# Patient Record
Sex: FEMALE | Race: WHITE | NOT HISPANIC OR LATINO | Employment: FULL TIME | URBAN - METROPOLITAN AREA
[De-identification: names, ages, dates, MRNs, and addresses within clinical notes are randomized per-mention and may not be internally consistent; named-entity substitution may affect disease eponyms.]

---

## 2017-05-12 NOTE — PROGRESS NOTES
SUBJECTIVE:                                                    Sandy Floyd is a 41 year old female who presents to clinic today for the following health issues:      Would like to discuss starting low dose birth control      The patient reports she wants to begin Accutane and would like a prescription for birth control. She notes she experiences acne on her face, but not on her shoulders or back. She notes the acne is exacerbated with exercise. The patient states her last menstrual cycle was mid March. She notes she has abstained from intercourse for two weeks and she had a negative pregnancy test. She denies concerns with her mood.     Problem list and histories reviewed & adjusted, as indicated.  Additional history: as documented    Patient Active Problem List   Diagnosis     Back pain     Obesity     Pancreatitis, acute     Cholelithiasis     Dyspareunia     Pain in joint, pelvic region and thigh     Muscle weakness (generalized)     Moderate recurrent major depression (H)     CARDIOVASCULAR SCREENING; LDL GOAL LESS THAN 160     Anxiety state     Plantar fasciitis     Acne, unspecified acne type     Past Surgical History:   Procedure Laterality Date     CHOLECYSTECTOMY, LAPOROSCOPIC  3/15/11    Cholecystectomy, Laparoscopic     COSMETIC SURGERY       HC REDUCTION OF LARGE BREAST  3/11/09     SURGICAL HISTORY OF -   2003    laser tx on condyloma       Social History   Substance Use Topics     Smoking status: Never Smoker     Smokeless tobacco: Never Used      Comment: I don't smoke.     Alcohol use No     Family History   Problem Relation Age of Onset     CANCER Paternal Grandfather      unknown     Arthritis Maternal Grandmother      CANCER Father      prostate cancer     Prostate Cancer Father      Circulatory Maternal Grandfather      blood clots     Arthritis Paternal Grandmother      CANCER Brother      Testicular cancer     DIABETES Other      Hypertension Other      Asthma No family hx of       C.A.D. No family hx of      CEREBROVASCULAR DISEASE No family hx of      Breast Cancer No family hx of      Cancer - colorectal No family hx of      Alzheimer Disease No family hx of      Blood Disease No family hx of      Cardiovascular No family hx of      HEART DISEASE No family hx of      Eye Disorder No family hx of      GASTROINTESTINAL DISEASE No family hx of      Genitourinary Problems No family hx of      Lipids No family hx of      Musculoskeletal Disorder No family hx of      Neurologic Disorder No family hx of      Respiratory No family hx of      Thyroid Disease No family hx of          Current Outpatient Prescriptions   Medication Sig Dispense Refill     Cholecalciferol (VITAMIN D3 PO) Take 1 tablet by mouth daily       Omega-3 Fatty Acids (FISH OIL OMEGA-3 PO) Take 1 tablet by mouth 2 times daily       norgestimate-ethinyl estradiol (ORTHO-CYCLEN, SPRINTEC) 0.25-35 MG-MCG per tablet Take 1 tablet by mouth daily 84 tablet 0     FLUoxetine (PROZAC) 40 MG capsule Take 1 capsule (40 mg) by mouth daily 90 capsule 3     spironolactone (ALDACTONE) 50 MG tablet        DIFFERIN 0.1 % gel        Multiple Vitamin (DAILY VITAMINS PO)          Reviewed and updated as needed this visit by clinical staff  Tobacco  Allergies  Med Hx  Surg Hx  Fam Hx  Soc Hx      Reviewed and updated as needed this visit by Provider         ROS:  Constitutional, neuro, ENT, endocrine, pulmonary, cardiac, gastrointestinal, genitourinary, musculoskeletal, integument and psychiatric systems are negative, except as otherwise noted.    This document serves as a record of the services and decisions personally performed and made by Lauren Mathew DNP. It was created on her behalf by Marcelina Serna, a trained medical scribe. The creation of this document is based the provider's statements to the medical scribe.  Marcelina Serna 8:42 AM May 19, 2017      OBJECTIVE:                                                    /64  Pulse 68  Temp  "98  F (36.7  C) (Temporal)  Ht 5' 2.5\" (1.588 m)  Wt 210 lb (95.3 kg)  LMP 03/12/2017 (Approximate)  BMI 37.8 kg/m2  Body mass index is 37.8 kg/(m^2).  GENERAL APPEARANCE: healthy, alert and no distress  RESP: lungs clear to auscultation - no rales, rhonchi or wheezes  CV: regular rates and rhythm, normal S1 S2, no S3 or S4 and no murmur, click or rub  SKIN: no suspicious lesions or rashes, skin clear today  NEURO: Normal strength and tone, mentation intact and speech normal  PSYCH: mentation appears normal and affect normal/bright    Diagnostic test results:  No results found for this or any previous visit (from the past 24 hour(s)).     ASSESSMENT/PLAN:                                                        ICD-10-CM    1. Encounter for initial prescription of contraceptive pills Z30.011 norgestimate-ethinyl estradiol (ORTHO-CYCLEN, SPRINTEC) 0.25-35 MG-MCG per tablet   2. Acne, unspecified acne type L70.9               Discussed acne, Accutane, and birth control. Order placed for sprintec. Medication direction, dosage, and side effects discussed with patient. Patient to have blood pressure checked in three months. Advised patient to use a backup method of birth control for one week.     Follow up with Provider - 3 months   Follow up with: Dermatology for Accutane     JAVID Ordonez CNP  [facility]    The information in this document, created by a scribe for me, accurately reflects the services I personally performed and the decisions made by me. I have reviewed and approved this document for accuracy. JAVID Romero, CNP, DNP.   "

## 2017-05-19 ENCOUNTER — OFFICE VISIT (OUTPATIENT)
Dept: FAMILY MEDICINE | Facility: CLINIC | Age: 42
End: 2017-05-19
Payer: COMMERCIAL

## 2017-05-19 VITALS
SYSTOLIC BLOOD PRESSURE: 106 MMHG | HEIGHT: 63 IN | DIASTOLIC BLOOD PRESSURE: 64 MMHG | HEART RATE: 68 BPM | WEIGHT: 210 LBS | BODY MASS INDEX: 37.21 KG/M2 | TEMPERATURE: 98 F

## 2017-05-19 DIAGNOSIS — L70.9 ACNE, UNSPECIFIED ACNE TYPE: ICD-10-CM

## 2017-05-19 DIAGNOSIS — Z30.011 ENCOUNTER FOR INITIAL PRESCRIPTION OF CONTRACEPTIVE PILLS: Primary | ICD-10-CM

## 2017-05-19 PROCEDURE — 99213 OFFICE O/P EST LOW 20 MIN: CPT | Performed by: NURSE PRACTITIONER

## 2017-05-19 RX ORDER — NORGESTIMATE AND ETHINYL ESTRADIOL 0.25-0.035
1 KIT ORAL DAILY
Qty: 84 TABLET | Refills: 0 | Status: SHIPPED | OUTPATIENT
Start: 2017-05-19 | End: 2017-08-13

## 2017-05-19 ASSESSMENT — ANXIETY QUESTIONNAIRES
7. FEELING AFRAID AS IF SOMETHING AWFUL MIGHT HAPPEN: NOT AT ALL
3. WORRYING TOO MUCH ABOUT DIFFERENT THINGS: SEVERAL DAYS
GAD7 TOTAL SCORE: 4
2. NOT BEING ABLE TO STOP OR CONTROL WORRYING: SEVERAL DAYS
IF YOU CHECKED OFF ANY PROBLEMS ON THIS QUESTIONNAIRE, HOW DIFFICULT HAVE THESE PROBLEMS MADE IT FOR YOU TO DO YOUR WORK, TAKE CARE OF THINGS AT HOME, OR GET ALONG WITH OTHER PEOPLE: SOMEWHAT DIFFICULT
5. BEING SO RESTLESS THAT IT IS HARD TO SIT STILL: NOT AT ALL
6. BECOMING EASILY ANNOYED OR IRRITABLE: NOT AT ALL
1. FEELING NERVOUS, ANXIOUS, OR ON EDGE: SEVERAL DAYS

## 2017-05-19 ASSESSMENT — PATIENT HEALTH QUESTIONNAIRE - PHQ9: 5. POOR APPETITE OR OVEREATING: SEVERAL DAYS

## 2017-05-19 ASSESSMENT — PAIN SCALES - GENERAL: PAINLEVEL: NO PAIN (0)

## 2017-05-19 NOTE — NURSING NOTE
"Chief Complaint   Patient presents with     Contraception     Panel Management     PHQ-9/ESTRELLITA       Initial /64  Pulse 68  Temp 98  F (36.7  C) (Temporal)  Ht 5' 2.5\" (1.588 m)  Wt 210 lb (95.3 kg)  LMP 03/12/2017 (Approximate)  BMI 37.8 kg/m2 Estimated body mass index is 37.8 kg/(m^2) as calculated from the following:    Height as of this encounter: 5' 2.5\" (1.588 m).    Weight as of this encounter: 210 lb (95.3 kg).  Medication Reconciliation: complete     Kristan Ulloa CMA (AAMA)      "

## 2017-05-19 NOTE — MR AVS SNAPSHOT
After Visit Summary   5/19/2017    Sandy Floyd    MRN: [id number]           Patient Information     Date Of Birth          1975        Visit Information        Provider Department      5/19/2017 8:20 AM Lauren Mathew APRN CNP [facility]        Today's Diagnoses     Encounter for initial prescription of contraceptive pills    -  1    Acne, unspecified acne type           Follow-ups after your visit        Your next 10 appointments already scheduled     Aug 18, 2017  8:30 AM CDT   Nurse Only with RG FLOAT 1   [facility] ([facility])    [address], [address]  [place] [address] [phone]              Who to contact     If you have questions or need follow up information about today's clinic visit or your schedule please contact [facility] directly at [phone].  Normal or non-critical lab and imaging results will be communicated to you by MyChart, letter or phone within 4 business days after the clinic has received the results. If you do not hear from us within 7 days, please contact the clinic through EyeIChart or phone. If you have a critical or abnormal lab result, we will notify you by phone as soon as possible.  Submit refill requests through Callision or call your pharmacy and they will forward the refill request to us. Please allow 3 business days for your refill to be completed.          Additional Information About Your Visit        MyChart Information     Callision gives you secure access to your electronic health record. If you see a primary care provider, you can also send messages to your care team and make appointments. If you have questions, please call your primary care clinic.  If you do not have a primary care provider, please call [phone] and they will assist you.        Care EveryWhere ID     This is your Care EveryWhere ID. This could be used by other organizations to access your [facility]  "records  [id number]        Your Vitals Were     Pulse Temperature Height Last Period BMI (Body Mass Index)       68 98  F (36.7  C) (Temporal) 5' 2.5\" (1.588 m) 03/12/2017 (Approximate) 37.8 kg/m2        Blood Pressure from Last 3 Encounters:   05/19/17 106/64   08/24/16 106/72   05/11/15 110/80    Weight from Last 3 Encounters:   05/19/17 210 lb (95.3 kg)   08/24/16 203 lb 14.4 oz (92.5 kg)   05/11/15 186 lb 14.4 oz (84.8 kg)              Today, you had the following     No orders found for display         Today's Medication Changes          These changes are accurate as of: 5/19/17 11:59 PM.  If you have any questions, ask your nurse or doctor.               Start taking these medicines.        Dose/Directions    norgestimate-ethinyl estradiol 0.25-35 MG-MCG per tablet   Commonly known as:  ORTHO-CYCLEN, SPRINTEC   Used for:  Encounter for initial prescription of contraceptive pills   Started by:  Lauren Mathew APRN CNP        Dose:  1 tablet   Take 1 tablet by mouth daily   Quantity:  84 tablet   Refills:  0            Where to get your medicines      These medications were sent to [facility] - [place], [place] - [address]  [address] [address]     Phone:  [phone]     norgestimate-ethinyl estradiol 0.25-35 MG-MCG per tablet                Primary Care Provider Office Phone # Fax #    JAVID Mei [facility] [phone]       [facility] [address] [address] [address]        Thank you!     Thank you for choosing [facility]  for your care. Our goal is always to provide you with excellent care. Hearing back from our patients is one way we can continue to improve our services. Please take a few minutes to complete the written survey that you may receive in the mail after your visit with us. Thank you!             Your Updated Medication List - Protect others around you: Learn how to safely use, store and throw away your medicines at " www.disposemymeds.org.          This list is accurate as of: 5/19/17 11:59 PM.  Always use your most recent med list.                   Brand Name Dispense Instructions for use    DAILY VITAMINS PO          DIFFERIN 0.1 % gel   Generic drug:  adapalene          FISH OIL OMEGA-3 PO      Take 1 tablet by mouth 2 times daily       FLUoxetine 40 MG capsule    PROzac    90 capsule    Take 1 capsule (40 mg) by mouth daily       norgestimate-ethinyl estradiol 0.25-35 MG-MCG per tablet    ORTHO-CYCLEN, SPRINTEC    84 tablet    Take 1 tablet by mouth daily       spironolactone 50 MG tablet    ALDACTONE         VITAMIN D3 PO      Take 1 tablet by mouth daily

## 2017-05-20 ASSESSMENT — ANXIETY QUESTIONNAIRES: GAD7 TOTAL SCORE: 4

## 2017-05-20 ASSESSMENT — PATIENT HEALTH QUESTIONNAIRE - PHQ9: SUM OF ALL RESPONSES TO PHQ QUESTIONS 1-9: 2

## 2017-08-13 ENCOUNTER — TELEPHONE (OUTPATIENT)
Dept: FAMILY MEDICINE | Facility: CLINIC | Age: 42
End: 2017-08-13

## 2017-08-13 DIAGNOSIS — Z30.011 ENCOUNTER FOR INITIAL PRESCRIPTION OF CONTRACEPTIVE PILLS: ICD-10-CM

## 2017-08-15 RX ORDER — NORGESTIMATE AND ETHINYL ESTRADIOL 0.25-0.035
KIT ORAL
Qty: 28 TABLET | Refills: 0 | Status: SHIPPED | OUTPATIENT
Start: 2017-08-15 | End: 2017-08-28

## 2017-08-15 NOTE — TELEPHONE ENCOUNTER
norgestimate-ethinyl estradiol (ORTHO-CYCLEN, SPRINTEC) 0.25-35 MG-MCG per tablet      Last Written Prescription Date: 05/19/17  Last Fill Quantity: 84,  # refills: 0   Last Office Visit with G, P or [facility] prescribing provider: 05/19/17                                         Next 5 appointments (look out 90 days)     Aug 18, 2017  8:30 AM CDT   Nurse Only with RG FLOAT 1   [facility]    [address]  [place] [address] [phone]

## 2017-08-15 NOTE — TELEPHONE ENCOUNTER
norgestimate-ethinyl estradiol (ORTHO-CYCLEN, SPRINTEC) 0.25-35 MG-MCG per tablet  Medication is being filled for 1 time refill only due to:  Patient needs to be seen because due for 3 month follow up from starting this medication.     LM informing the patient she is due for a medication follow up prior to additional refills.     Zoila Hewitt, RN, BSN

## 2017-08-16 NOTE — TELEPHONE ENCOUNTER
Reason for call:  Pt is wondering if she can have a telephone visit for medication refill. Please advise, pt is coming in on Friday for a BP check with the nurse and she was seen back in may for this. Please let pt know if she can do a telephone visit or e-visit according to the process only MA or RN can schedule this. Please advise.

## 2017-08-18 ENCOUNTER — ALLIED HEALTH/NURSE VISIT (OUTPATIENT)
Dept: FAMILY MEDICINE | Facility: CLINIC | Age: 42
End: 2017-08-18
Payer: COMMERCIAL

## 2017-08-18 VITALS — HEART RATE: 66 BPM | DIASTOLIC BLOOD PRESSURE: 74 MMHG | SYSTOLIC BLOOD PRESSURE: 104 MMHG

## 2017-08-18 PROCEDURE — 99207 ZZC NO CHARGE NURSE ONLY: CPT

## 2017-08-18 NOTE — NURSING NOTE
Sandy Floyd is a 41 year old female who comes in today for a Blood Pressure check because of ongoing blood pressure monitoring.    *Document pulse and BP  *Use new set of vitals button for multiple readings.  *Use extended vitals for orthostatic    Vitals as recorded, a large cuff was used.    Patient is taking medication as prescribed  Patient is tolerating medications well.  Patient is not monitoring Blood Pressure at home.      Current complaints: none    Disposition: results routed to MD/AP

## 2017-08-18 NOTE — MR AVS SNAPSHOT
After Visit Summary   8/18/2017    Sandy Floyd    MRN: [id number]           Patient Information     Date Of Birth          1975        Visit Information        Provider Department      8/18/2017 8:30 AM RG FLOAT 1 [facility]        Today's Diagnoses     Contraception    -  1       Follow-ups after your visit        Your next 10 appointments already scheduled     Aug 28, 2017 12:20 PM CDT   Telephone Visit with JAVID Mei CNP   [facility] ([facility]    [address], [address]  [place] [address] [phone]           Note: this is not an onsite visit; there is no need to come to the facility.              Who to contact     If you have questions or need follow up information about today's clinic visit or your schedule please contact [facility] directly at [phone].  Normal or non-critical lab and imaging results will be communicated to you by MyChart, letter or phone within 4 business days after the clinic has received the results. If you do not hear from us within 7 days, please contact the clinic through The Luxury Club[employer] or phone. If you have a critical or abnormal lab result, we will notify you by phone as soon as possible.  Submit refill requests through [employer] or call your pharmacy and they will forward the refill request to us. Please allow 3 business days for your refill to be completed.          Additional Information About Your Visit        MyChart Information     [employer] gives you secure access to your electronic health record. If you see a primary care provider, you can also send messages to your care team and make appointments. If you have questions, please call your primary care clinic.  If you do not have a primary care provider, please call [phone] and they will assist you.        Care EveryWhere ID     This is your Care EveryWhere ID. This could be used by other organizations to access your [facility]  medical records  [id number]        Your Vitals Were     Pulse                   66            Blood Pressure from Last 3 Encounters:   08/18/17 104/74   05/19/17 106/64   08/24/16 106/72    Weight from Last 3 Encounters:   05/19/17 210 lb (95.3 kg)   08/24/16 203 lb 14.4 oz (92.5 kg)   05/11/15 186 lb 14.4 oz (84.8 kg)              Today, you had the following     No orders found for display       Primary Care Provider Office Phone # Fax #    Lauren PERES [place], APRN [facility] [phone] [address] [address]        Equal Access to Services     RODRÍGUEZ ROSENBERG : Hadii jerry scott hadasho Soomaali, waaxda luqadaha, qaybta kaalmada adeegyada, irish thompson . So [facility] [phone].    ATENCIÓN: Si habla español, tiene a phillips disposición servicios gratuitos de asistencia lingüística. Llame al [phone].    We comply with applicable federal civil rights laws and [place] laws. We do not discriminate on the basis of race, color, national origin, age, disability sex, sexual orientation or gender identity.            Thank you!     Thank you for choosing [facility]  for your care. Our goal is always to provide you with excellent care. Hearing back from our patients is one way we can continue to improve our services. Please take a few minutes to complete the written survey that you may receive in the mail after your visit with us. Thank you!             Your Updated Medication List - Protect others around you: Learn how to safely use, store and throw away your medicines at www.disposemymeds.org.          This list is accurate as of: 8/18/17 11:59 PM.  Always use your most recent med list.                   Brand Name Dispense Instructions for use Diagnosis    DAILY VITAMINS PO           DIFFERIN 0.1 % gel   Generic drug:  adapalene           FISH OIL OMEGA-3 PO      Take 1 tablet by mouth 2 times daily        FLUoxetine 40 MG capsule    PROzac    90 capsule    Take  1 capsule (40 mg) by mouth daily    Major depressive disorder, recurrent episode, moderate (H)       spironolactone 50 MG tablet    ALDACTONE          SPRINTEC 28 0.25-35 MG-MCG per tablet   Generic drug:  norgestimate-ethinyl estradiol     28 tablet    TAKE ONE TABLET BY MOUTH ONE TIME DAILY    Encounter for initial prescription of contraceptive pills       VITAMIN D3 PO      Take 1 tablet by mouth daily

## 2017-08-28 ENCOUNTER — TELEPHONE (OUTPATIENT)
Dept: FAMILY MEDICINE | Facility: CLINIC | Age: 42
End: 2017-08-28

## 2017-08-28 ENCOUNTER — VIRTUAL VISIT (OUTPATIENT)
Dept: FAMILY MEDICINE | Facility: CLINIC | Age: 42
End: 2017-08-28

## 2017-08-28 DIAGNOSIS — Z30.011 ENCOUNTER FOR INITIAL PRESCRIPTION OF CONTRACEPTIVE PILLS: ICD-10-CM

## 2017-08-28 DIAGNOSIS — Z53.9 ERRONEOUS ENCOUNTER--DISREGARD: Primary | ICD-10-CM

## 2017-08-28 RX ORDER — NORGESTIMATE AND ETHINYL ESTRADIOL 0.25-0.035
1 KIT ORAL DAILY
Qty: 84 TABLET | Refills: 3 | Status: SHIPPED | OUTPATIENT
Start: 2017-08-28 | End: 2017-08-31 | Stop reason: SINTOL

## 2017-08-28 RX ORDER — ISOTRETINOIN 40 MG/1
CAPSULE, LIQUID FILLED ORAL
Refills: 0 | COMMUNITY
Start: 2017-08-14 | End: 2019-05-14

## 2017-08-28 NOTE — PROGRESS NOTES
"Sandy Floyd is a 41 year old female who is being evaluated via a telephone visit.      The patient has been notified of following:     \"This telephone visit will be conducted via a call between you and your physician/provider. We have found that certain health care needs can be provided without the need for a physical exam.  This service lets us provide the care you need with a short phone conversation.  If a prescription is necessary we can send it directly to your pharmacy.  If lab work is needed we can place an order for that and you can then stop by our lab to have the test done at a later time.    We will bill your insurance company for this service.  Please check with your medical insurance if this type of visit is covered. You may be responsible for the cost of this type of visit if insurance coverage is denied.  The typical cost is $30 (10min), $59 (11-20min) and $85 (21-30min).  Most often these visits are shorter than 10 minutes.    If during the course of the call the physician/provider feels a telephone visit is not appropriate, you will not be charged for this service.\"       Consent has been obtained for this service by 2 care team members: yes. See the scanned image in the medical record.    Sandy Floyd complains of  Contraception      I have reviewed and updated the patient's Past Medical History, Social History, Family History and Medication List.    ALLERGIES  No known drug allergies    Vilma Monahan CMA   (MA signature)    Additional provider notes:     Assessment/Plan:  No diagnosis found.    I have reviewed the note as documented above.  This accurately captures the substance of my conversation with the patient,      Total time of call between patient and provider was  minutes     "

## 2017-08-28 NOTE — MR AVS SNAPSHOT
After Visit Summary   8/28/2017    Sandy Floyd    MRN: [id number]           Patient Information     Date Of Birth          1975        Visit Information        Provider Department      8/28/2017 12:20 PM Lauren Mathew APRN CNP [facility] [place]        Today's Diagnoses     ERRONEOUS ENCOUNTER--DISREGARD    -  1       Follow-ups after your visit        Who to contact     If you have questions or need follow up information about today's clinic visit or your schedule please contact [facility] directly at [phone].  Normal or non-critical lab and imaging results will be communicated to you by TweetPhotohart, letter or phone within 4 business days after the clinic has received the results. If you do not hear from us within 7 days, please contact the clinic through TweetPhotohart or phone. If you have a critical or abnormal lab result, we will notify you by phone as soon as possible.  Submit refill requests through Intellitactics or call your pharmacy and they will forward the refill request to us. Please allow 3 business days for your refill to be completed.          Additional Information About Your Visit        MyChart Information     Intellitactics gives you secure access to your electronic health record. If you see a primary care provider, you can also send messages to your care team and make appointments. If you have questions, please call your primary care clinic.  If you do not have a primary care provider, please call [phone] and they will assist you.        Care EveryWhere ID     This is your Care EveryWhere ID. This could be used by other organizations to access your [facility] medical records  [id number]         Blood Pressure from Last 3 Encounters:   08/18/17 104/74   05/19/17 106/64   08/24/16 106/72    Weight from Last 3 Encounters:   05/19/17 210 lb (95.3 kg)   08/24/16 203 lb 14.4 oz (92.5 kg)   05/11/15 186 lb 14.4 oz (84.8 kg)              Today, you had the following      No orders found for display       Primary Care Provider Office Phone # Fax #    JAVID Mei [facility] [phone] [address] [address]        Equal Access to Services     ADILIA ROSENBERG : Hadii jerry ku haderickao Soomaali, waaxda luqadaha, qaybta kaalmada adeegyada, waxmelani idiin sanazn kathi thomas lamarivel woods. So [facility] [phone].    ATENCIÓN: Si habla español, tiene a phillips disposición servicios gratuitos de asistencia lingüística. Llame al [phone].    We comply with applicable federal civil rights laws and [place] laws. We do not discriminate on the basis of race, color, national origin, age, disability sex, sexual orientation or gender identity.            Thank you!     Thank you for choosing [facility]  for your care. Our goal is always to provide you with excellent care. Hearing back from our patients is one way we can continue to improve our services. Please take a few minutes to complete the written survey that you may receive in the mail after your visit with us. Thank you!             Your Updated Medication List - Protect others around you: Learn how to safely use, store and throw away your medicines at www.disposemymeds.org.          This list is accurate as of: 8/28/17  8:54 PM.  Always use your most recent med list.                   Brand Name Dispense Instructions for use Diagnosis    CLARAVIS 40 MG capsule   Generic drug:  ISOtretinoin           DAILY VITAMINS PO           DIFFERIN 0.1 % gel   Generic drug:  adapalene           FISH OIL OMEGA-3 PO      Take 1 tablet by mouth 2 times daily        FLUoxetine 40 MG capsule    PROzac    90 capsule    Take 1 capsule (40 mg) by mouth daily    Major depressive disorder, recurrent episode, moderate (H)       spironolactone 50 MG tablet    ALDACTONE          SPRINTEC 28 0.25-35 MG-MCG per tablet   Generic drug:  norgestimate-ethinyl estradiol     28 tablet    TAKE ONE TABLET BY MOUTH ONE TIME DAILY    Encounter  for initial prescription of contraceptive pills       VITAMIN D3 PO      Take 1 tablet by mouth daily

## 2017-08-30 ENCOUNTER — MYC MEDICAL ADVICE (OUTPATIENT)
Dept: FAMILY MEDICINE | Facility: CLINIC | Age: 42
End: 2017-08-30

## 2017-08-31 ENCOUNTER — OFFICE VISIT (OUTPATIENT)
Dept: FAMILY MEDICINE | Facility: CLINIC | Age: 42
End: 2017-08-31
Payer: COMMERCIAL

## 2017-08-31 VITALS
HEART RATE: 76 BPM | RESPIRATION RATE: 20 BRPM | WEIGHT: 209 LBS | SYSTOLIC BLOOD PRESSURE: 106 MMHG | HEIGHT: 62 IN | DIASTOLIC BLOOD PRESSURE: 80 MMHG | BODY MASS INDEX: 38.46 KG/M2 | TEMPERATURE: 98.5 F

## 2017-08-31 DIAGNOSIS — I82.492 ACUTE DEEP VEIN THROMBOSIS (DVT) OF OTHER SPECIFIED VEIN OF LEFT LOWER EXTREMITY (H): Primary | ICD-10-CM

## 2017-08-31 PROCEDURE — 99214 OFFICE O/P EST MOD 30 MIN: CPT | Performed by: NURSE PRACTITIONER

## 2017-08-31 RX ORDER — RIVAROXABAN 15 MG/1
15 TABLET, FILM COATED ORAL
Refills: 0 | COMMUNITY
Start: 2017-08-29 | End: 2017-08-31 | Stop reason: ALTCHOICE

## 2017-08-31 RX ORDER — DABIGATRAN ETEXILATE 150 MG/1
CAPSULE ORAL
Qty: 180 CAPSULE | Refills: 0 | Status: SHIPPED | OUTPATIENT
Start: 2017-08-31 | End: 2019-03-14

## 2017-08-31 ASSESSMENT — ANXIETY QUESTIONNAIRES
1. FEELING NERVOUS, ANXIOUS, OR ON EDGE: MORE THAN HALF THE DAYS
2. NOT BEING ABLE TO STOP OR CONTROL WORRYING: MORE THAN HALF THE DAYS
7. FEELING AFRAID AS IF SOMETHING AWFUL MIGHT HAPPEN: SEVERAL DAYS
3. WORRYING TOO MUCH ABOUT DIFFERENT THINGS: MORE THAN HALF THE DAYS
GAD7 TOTAL SCORE: 10
5. BEING SO RESTLESS THAT IT IS HARD TO SIT STILL: SEVERAL DAYS
4. TROUBLE RELAXING: MORE THAN HALF THE DAYS
7. FEELING AFRAID AS IF SOMETHING AWFUL MIGHT HAPPEN: SEVERAL DAYS
6. BECOMING EASILY ANNOYED OR IRRITABLE: NOT AT ALL
GAD7 TOTAL SCORE: 10
GAD7 TOTAL SCORE: 10

## 2017-08-31 ASSESSMENT — PATIENT HEALTH QUESTIONNAIRE - PHQ9
SUM OF ALL RESPONSES TO PHQ QUESTIONS 1-9: 7
10. IF YOU CHECKED OFF ANY PROBLEMS, HOW DIFFICULT HAVE THESE PROBLEMS MADE IT FOR YOU TO DO YOUR WORK, TAKE CARE OF THINGS AT HOME, OR GET ALONG WITH OTHER PEOPLE: SOMEWHAT DIFFICULT
SUM OF ALL RESPONSES TO PHQ QUESTIONS 1-9: 7

## 2017-08-31 ASSESSMENT — PAIN SCALES - GENERAL: PAINLEVEL: MILD PAIN (2)

## 2017-08-31 NOTE — LETTER
August 31, 2017      Sandy Floyd  [address] [address]        To Whom It May Concern:    Sandy Floyd was seen in our clinic today and treated for a medical condition, DVT. She may return to work with the following: no restrictions on or about 9/5/17.       Sincerely,        JAVID Ordonez CNP

## 2017-08-31 NOTE — MR AVS SNAPSHOT
After Visit Summary   8/31/2017    Sandy Floyd    MRN: [id number]           Patient Information     Date Of Birth          1975        Visit Information        Provider Department      8/31/2017 3:20 PM Lauren Mathew APRN CNP [place] [facility]        Today's Diagnoses     Acute deep vein thrombosis (DVT) of other specified vein of left lower extremity (H)    -  1       Follow-ups after your visit        Additional Services     ONC/HEME ADULT REFERRAL       Your provider has referred you to: [facility]: [facility] [place] [facility] [phone]   [email or web address]    Please be aware that coverage of these services is subject to the terms and limitations of your health insurance plan.  Call member services at your health plan with any benefit or coverage questions.      Please bring the following with you to your appointment:    (1) Any X-Rays, CTs or MRIs which have been performed.  Contact the facility where they were done to arrange for  prior to your scheduled appointment.   (2) List of current medications  (3) This referral request   (4) Any documents/labs given to you for this referral                  Future tests that were ordered for you today     Open Future Orders        Priority Expected Expires Ordered    US Lower Extremity Venous Duplex Left Routine  8/31/2018 8/31/2017            Who to contact     If you have questions or need follow up information about today's clinic visit or your schedule please contact [facility] directly at [phone].  Normal or non-critical lab and imaging results will be communicated to you by MyChart, letter or phone within 4 business days after the clinic has received the results. If you do not hear from us within 7 days, please contact the clinic through MyChart or phone. If you have a critical or abnormal lab result, we will notify you by  "phone as soon as possible.  Submit refill requests through [employer] or call your pharmacy and they will forward the refill request to us. Please allow 3 business days for your refill to be completed.          Additional Information About Your Visit        [employer] Information     [employer] gives you secure access to your electronic health record. If you see a primary care provider, you can also send messages to your care team and make appointments. If you have questions, please call your primary care clinic.  If you do not have a primary care provider, please call [phone] and they will assist you.        Care EveryWhere ID     This is your Care EveryWhere ID. This could be used by other organizations to access your [place] medical records  [id number]        Your Vitals Were     Pulse Temperature Respirations Height Last Period BMI (Body Mass Index)    76 98.5  F (36.9  C) (Temporal) 20 5' 2.21\" (1.58 m) 07/15/2017 (Approximate) 37.98 kg/m2       Blood Pressure from Last 3 Encounters:   08/31/17 106/80   08/18/17 104/74   05/19/17 106/64    Weight from Last 3 Encounters:   08/31/17 209 lb (94.8 kg)   05/19/17 210 lb (95.3 kg)   08/24/16 203 lb 14.4 oz (92.5 kg)              We Performed the Following     DEPRESSION ACTION PLAN (DAP)     ONC/HEME ADULT REFERRAL          Today's Medication Changes          These changes are accurate as of: 8/31/17  4:06 PM.  If you have any questions, ask your nurse or doctor.               Start taking these medicines.        Dose/Directions    dabigatran ANTICOAGULANT 150 MG capsule   Commonly known as:  PRADAXA   Used for:  Acute deep vein thrombosis (DVT) of other specified vein of left lower extremity (H)   Started by:  Lauren Mathew APRN CNP        Take 1 capsule (150 mg) by mouth twice daily. Store in original 's bottle or blister pack; use within 120 days of opening.   Quantity:  180 capsule   Refills:  0         Stop taking these medicines if you haven't " already. Please contact your care team if you have questions.     norgestimate-ethinyl estradiol 0.25-35 MG-MCG per tablet   Commonly known as:  SPRINTEC 28   Stopped by:  Lauren Mathew APRN CNP           XARELTO 15 MG Tabs tablet   Generic drug:  rivaroxaban ANTICOAGULANT   Stopped by:  Lauren Mathew APRN CNP                Where to get your medicines      These medications were sent to [facility] - [place], [place] - [address]  [address], [facility] [address]     Phone:  [phone]     dabigatran ANTICOAGULANT 150 MG capsule                Primary Care Provider Office Phone # Fax #    JAVID Mei [phone] [address] [facility] [address]        Equal Access to Services     [facility]: Hadii jerry ku hadasho Soomaali, waaxda luqadaha, qaybta kaalmada adeegyada, irish idiin haypietro thompson . So [facility] [place] [phone].    ATENCIÓN: Si habla español, tiene a phillips disposición servicios gratuitos de asistencia lingüística. [facility] [phone].    We comply with applicable federal civil rights laws and [place] laws. We do not discriminate on the basis of race, color, national origin, age, disability sex, sexual orientation or gender identity.            Thank you!     Thank you for choosing [facility]  for your care. Our goal is always to provide you with excellent care. Hearing back from our patients is one way we can continue to improve our services. Please take a few minutes to complete the written survey that you may receive in the mail after your visit with us. Thank you!             Your Updated Medication List - Protect others around you: Learn how to safely use, store and throw away your medicines at www.disposemymeds.org.          This list is accurate as of: 8/31/17  4:06 PM.  Always use your most recent med list.                   Brand Name Dispense Instructions for use Diagnosis    CLARAVIS 40 MG capsule   Generic drug:   ISOtretinoin           dabigatran ANTICOAGULANT 150 MG capsule    PRADAXA    180 capsule    Take 1 capsule (150 mg) by mouth twice daily. Store in original 's bottle or blister pack; use within 120 days of opening.    Acute deep vein thrombosis (DVT) of other specified vein of left lower extremity (H)       DAILY VITAMINS PO           DIFFERIN 0.1 % gel   Generic drug:  adapalene           FISH OIL OMEGA-3 PO      Take 1 tablet by mouth daily        FLUoxetine 40 MG capsule    PROzac    90 capsule    Take 1 capsule (40 mg) by mouth daily    Major depressive disorder, recurrent episode, moderate (H)       spironolactone 50 MG tablet    ALDACTONE          VITAMIN D3 PO      Take 1 tablet by mouth daily

## 2017-08-31 NOTE — PROGRESS NOTES
"  SUBJECTIVE:                                                    Sandy Floyd is a 41 year old female who presents to clinic today for the following health issues:      Concern - Blood clot,   Onset: two days     Description:   The pain was very sensitive and ache pain of the left leg. Patient states that she saw clinical at her work place and had US done and found out that there are small blood clot on the left leg. No pain today     Intensity: 0/10    Progression of Symptoms:  \"it hard to tell, it feels better because off work two days now \"     Accompanying Signs & Symptoms:  Redness    Previous history of similar problem:   None     Precipitating factors:   Worsened by: Standing, walking     Alleviating factors:  Improved by: none    Therapies Tried and outcome: on blood thinner, resting at home     Patient reports the pain with sitting and walking has improved. She discontinued Accutane and oral contraceptives two days ago. She is taking Xarelto daily. She would like to finish Accutane treatment, and will abstain from for those months instead of using oral contraceptives. She denies known history of bleeding disorders or DVT. She denies shortness of breath.     Problem list and histories reviewed & adjusted, as indicated.  Additional history: as documented    Patient Active Problem List   Diagnosis     Back pain     Obesity     Pancreatitis, acute     Cholelithiasis     Dyspareunia     Pain in joint, pelvic region and thigh     Muscle weakness (generalized)     Moderate recurrent major depression (H)     CARDIOVASCULAR SCREENING; LDL GOAL LESS THAN 160     Anxiety state     Plantar fasciitis     Acne, unspecified acne type     Past Surgical History:   Procedure Laterality Date     CHOLECYSTECTOMY, LAPOROSCOPIC  3/15/11    Cholecystectomy, Laparoscopic     COSMETIC SURGERY       HC REDUCTION OF LARGE BREAST  3/11/09     SURGICAL HISTORY OF -   2003    laser tx on condyloma       Social History "   Substance Use Topics     Smoking status: Never Smoker     Smokeless tobacco: Never Used      Comment: I don't smoke.     Alcohol use No     Family History   Problem Relation Age of Onset     CANCER Paternal Grandfather      unknown     Arthritis Maternal Grandmother      CANCER Father      prostate cancer     Prostate Cancer Father      Circulatory Maternal Grandfather      blood clots     Arthritis Paternal Grandmother      CANCER Brother      Testicular cancer     DIABETES Other      Hypertension Other      Asthma No family hx of      C.A.D. No family hx of      CEREBROVASCULAR DISEASE No family hx of      Breast Cancer No family hx of      Cancer - colorectal No family hx of      Alzheimer Disease No family hx of      Blood Disease No family hx of      Cardiovascular No family hx of      HEART DISEASE No family hx of      Eye Disorder No family hx of      GASTROINTESTINAL DISEASE No family hx of      Genitourinary Problems No family hx of      Lipids No family hx of      Musculoskeletal Disorder No family hx of      Neurologic Disorder No family hx of      Respiratory No family hx of      Thyroid Disease No family hx of          Current Outpatient Prescriptions   Medication Sig Dispense Refill     dabigatran ANTICOAGULANT (PRADAXA) 150 MG capsule Take 1 capsule (150 mg) by mouth twice daily. Store in original 's bottle or blister pack; use within 120 days of opening. 180 capsule 0     CLARAVIS 40 MG capsule   0     Cholecalciferol (VITAMIN D3 PO) Take 1 tablet by mouth daily       Omega-3 Fatty Acids (FISH OIL OMEGA-3 PO) Take 1 tablet by mouth daily        FLUoxetine (PROZAC) 40 MG capsule Take 1 capsule (40 mg) by mouth daily 90 capsule 3     Multiple Vitamin (DAILY VITAMINS PO)        spironolactone (ALDACTONE) 50 MG tablet        DIFFERIN 0.1 % gel            ROS:  Constitutional, neuro, ENT, endocrine, pulmonary, cardiac, gastrointestinal, genitourinary, musculoskeletal, integument and  "psychiatric systems are negative, except as otherwise noted.    This document serves as a record of the services and decisions personally performed and made by Lauren Mathew DNP. It was created on her behalf by Patti Celis, a trained medical scribe. The creation of this document is based on the provider's statements to the medical scribe.  Patti Celis 3:34 PM August 31, 2017    OBJECTIVE:                                                    /80  Pulse 76  Temp 98.5  F (36.9  C) (Temporal)  Resp 20  Ht 5' 2.21\" (1.58 m)  Wt 209 lb (94.8 kg)  LMP 07/15/2017 (Approximate)  BMI 37.98 kg/m2  Body mass index is 37.98 kg/(m^2).  GENERAL APPEARANCE: healthy, alert and no distress  HENT: ear canals and TM's normal and nose and mouth without ulcers or lesions  NECK: no adenopathy, no asymmetry, masses, or scars and thyroid normal to palpation  RESP: lungs clear to auscultation - no rales, rhonchi or wheezes  CV: regular rates and rhythm, normal S1 S2, no S3 or S4 and no murmur, click or rub  MS: tender region about 4 cm on upper mid left calf, pink, warm but not hot to touch, no obvious leg swelling, negative homans   NEURO: Normal strength and tone, mentation intact and speech normal  PSYCH: mentation appears normal and affect normal/bright       ASSESSMENT/PLAN:                                                        ICD-10-CM    1. Acute deep vein thrombosis (DVT) of other specified vein of left lower extremity (H) I82.492 dabigatran ANTICOAGULANT (PRADAXA) 150 MG capsule     US Lower Extremity Venous Duplex Left     ONC/HEME ADULT REFERRAL       Suspect blood clot was provoked by Accutane and oral contraceptives. Advised patient to take blood thinners for three months, discontinue birth control and continue to take Accutane per discretion of dermatology- on thinners for three months, and could consider continuing acne medication. Discouraged use of estrogen containing oral contraceptives going " forward, regardless. Order placed for Pradaxa due to formulary. Medication direction, dosage, and side effects discussed with patient. Referral placed to hematology. Repeat US in 2-3 months.     Follow up with Provider - 3-6 months     The information in this document, created by the medical scribe for me, accurately reflects the services I personally performed and the decisions made by me. I have reviewed and approved this document for accuracy prior to leaving the patient care area.  August 31, 2017 3:34 PM    JAVID Ordonez [facility]

## 2017-08-31 NOTE — LETTER
My Depression Action Plan  Name: Sandy Floyd   Date of Birth 1975  Date: 8/31/2017    My doctor: Lauren Mathew   My clinic: [facility]  [address] [place] [address], [address]  [place] [id number] [phone]          GREEN    ZONE   Good Control    What it looks like:     Things are going generally well. You have normal up s and down s. You may even feel depressed from time to time, but bad moods usually last less than a day.   What you need to do:  1. Continue to care for yourself (see self care plan)  2. Check your depression survival kit and update it as needed  3. Follow your physician s recommendations including any medication.  4. Do not stop taking medication unless you consult with your physician first.           YELLOW         ZONE Getting Worse    What it looks like:     Depression is starting to interfere with your life.     It may be hard to get out of bed; you may be starting to isolate yourself from others.    Symptoms of depression are starting to last most all day and this has happened for several days.     You may have suicidal thoughts but they are not constant.   What you need to do:     1. Call your care team, your response to treatment will improve if you keep your care team informed of your progress. Yellow periods are signs an adjustment may need to be made.     2. Continue your self-care, even if you have to fake it!    3. Talk to someone in your support network    4. Open up your depression survival kit           RED    ZONE Medical Alert - Get Help    What it looks like:     Depression is seriously interfering with your life.     You may experience these or other symptoms: You can t get out of bed most days, can t work or engage in other necessary activities, you have trouble taking care of basic hygiene, or basic responsibilities, thoughts of suicide or death that will not go away, self-injurious behavior.     What you need to do:  1. Call your care team  and request a same-day appointment. If they are not available (weekends or after hours) call your local crisis line, emergency room or 911.      Electronically signed by: Vilma Monahan, August 31, 2017    Depression Self Care Plan / Survival Kit    Self-Care for Depression  Here s the deal. Your body and mind are really not as separate as most people think.  What you do and think affects how you feel and how you feel influences what you do and think. This means if you do things that people who feel good do, it will help you feel better.  Sometimes this is all it takes.  There is also a place for medication and therapy depending on how severe your depression is, so be sure to consult with your medical provider and/ or Behavioral Health Consultant if your symptoms are worsening or not improving.     In order to better manage my stress, I will:    Exercise  Get some form of exercise, every day. This will help reduce pain and release endorphins, the  feel good  chemicals in your brain. This is almost as good as taking antidepressants!  This is not the same as joining a gym and then never going! (they count on that by the way ) It can be as simple as just going for a walk or doing some gardening, anything that will get you moving.      Hygiene   Maintain good hygiene (Get out of bed in the morning, Make your bed, Brush your teeth, Take a shower, and Get dressed like you were going to work, even if you are unemployed).  If your clothes don't fit try to get ones that do.    Diet  I will strive to eat foods that are good for me, drink plenty of water, and avoid excessive sugar, caffeine, alcohol, and other mood-altering substances.  Some foods that are helpful in depression are: complex carbohydrates, B vitamins, flaxseed, fish or fish oil, fresh fruits and vegetables.    Psychotherapy  I agree to participate in Individual Therapy (if recommended).    Medication  If prescribed medications, I agree to take them.  Missing  doses can result in serious side effects.  I understand that drinking alcohol, or other illicit drug use, may cause potential side effects.  I will not stop my medication abruptly without first discussing it with my provider.    Staying Connected With Others  I will stay in touch with my friends, family members, and my primary care provider/team.    Use your imagination  Be creative.  We all have a creative side; it doesn t matter if it s oil painting, sand castles, or mud pies! This will also kick up the endorphins.    Witness Beauty  (AKA stop and smell the roses) Take a look outside, even in mid-winter. Notice colors, textures. Watch the squirrels and birds.     Service to others  Be of service to others.  There is always someone else in need.  By helping others we can  get out of ourselves  and remember the really important things.  This also provides opportunities for practicing all the other parts of the program.    Humor  Laugh and be silly!  Adjust your TV habits for less news and crime-drama and more comedy.    Control your stress  Try breathing deep, massage therapy, biofeedback, and meditation. Find time to relax each day.     My support system    Clinic Contact:  Phone number:    Contact 1:  Phone number:    Contact 2:  Phone number:    Uatsdin/:  Phone number:    Therapist:  Phone number:    Local crisis center:    Phone number:    Other community support:  Phone number:

## 2017-08-31 NOTE — NURSING NOTE
"Chief Complaint   Patient presents with     Panel Management     DAP, PHQ9, ESTRELLITA     Deep Vein Thrombosis       Initial /80  Pulse 76  Temp 98.5  F (36.9  C) (Temporal)  Resp 20  Ht 5' 2.21\" (1.58 m)  Wt 209 lb (94.8 kg)  LMP 07/15/2017 (Approximate)  BMI 37.98 kg/m2 Estimated body mass index is 37.98 kg/(m^2) as calculated from the following:    Height as of this encounter: 5' 2.21\" (1.58 m).    Weight as of this encounter: 209 lb (94.8 kg).  Medication Reconciliation: complete     Fawad Joy MA    "

## 2017-09-01 ENCOUNTER — TELEPHONE (OUTPATIENT)
Dept: FAMILY MEDICINE | Facility: CLINIC | Age: 42
End: 2017-09-01

## 2017-09-01 ASSESSMENT — ANXIETY QUESTIONNAIRES: GAD7 TOTAL SCORE: 10

## 2017-09-01 ASSESSMENT — PATIENT HEALTH QUESTIONNAIRE - PHQ9: SUM OF ALL RESPONSES TO PHQ QUESTIONS 1-9: 7

## 2017-09-01 NOTE — TELEPHONE ENCOUNTER
Reason for call:  Alice MATTHEW would like to speak to you about your mutual patient Sandy.  Alice is from [facility].

## 2017-09-08 ENCOUNTER — MYC MEDICAL ADVICE (OUTPATIENT)
Dept: FAMILY MEDICINE | Facility: CLINIC | Age: 42
End: 2017-09-08

## 2017-09-12 ENCOUNTER — TELEPHONE (OUTPATIENT)
Dept: FAMILY MEDICINE | Facility: CLINIC | Age: 42
End: 2017-09-12

## 2017-09-12 NOTE — TELEPHONE ENCOUNTER
Reason for call:  Alice called to visit with you regarding Sandy's medication being the cause of a possible blood clot.  She states the likely villalba would be more because of the BC. [facility]

## 2017-09-14 ASSESSMENT — ANXIETY QUESTIONNAIRES
GAD7 TOTAL SCORE: 16
7. FEELING AFRAID AS IF SOMETHING AWFUL MIGHT HAPPEN: MORE THAN HALF THE DAYS
5. BEING SO RESTLESS THAT IT IS HARD TO SIT STILL: SEVERAL DAYS
6. BECOMING EASILY ANNOYED OR IRRITABLE: SEVERAL DAYS
GAD7 TOTAL SCORE: 16
3. WORRYING TOO MUCH ABOUT DIFFERENT THINGS: NEARLY EVERY DAY
2. NOT BEING ABLE TO STOP OR CONTROL WORRYING: NEARLY EVERY DAY
GAD7 TOTAL SCORE: 16
7. FEELING AFRAID AS IF SOMETHING AWFUL MIGHT HAPPEN: MORE THAN HALF THE DAYS
4. TROUBLE RELAXING: NEARLY EVERY DAY
1. FEELING NERVOUS, ANXIOUS, OR ON EDGE: NEARLY EVERY DAY

## 2017-09-15 ENCOUNTER — OFFICE VISIT (OUTPATIENT)
Dept: FAMILY MEDICINE | Facility: CLINIC | Age: 42
End: 2017-09-15
Payer: COMMERCIAL

## 2017-09-15 VITALS
SYSTOLIC BLOOD PRESSURE: 112 MMHG | HEIGHT: 62 IN | TEMPERATURE: 97.9 F | WEIGHT: 208.4 LBS | RESPIRATION RATE: 16 BRPM | BODY MASS INDEX: 38.35 KG/M2 | HEART RATE: 68 BPM | DIASTOLIC BLOOD PRESSURE: 70 MMHG

## 2017-09-15 DIAGNOSIS — Z23 NEED FOR VACCINATION: ICD-10-CM

## 2017-09-15 DIAGNOSIS — Z00.01 ENCOUNTER FOR ROUTINE ADULT HEALTH EXAMINATION WITH ABNORMAL FINDINGS: Primary | ICD-10-CM

## 2017-09-15 DIAGNOSIS — Z12.31 ENCOUNTER FOR SCREENING MAMMOGRAM FOR BREAST CANCER: ICD-10-CM

## 2017-09-15 DIAGNOSIS — N89.8 VAGINAL IRRITATION: ICD-10-CM

## 2017-09-15 DIAGNOSIS — I82.402 ACUTE DEEP VEIN THROMBOSIS (DVT) OF LEFT LOWER EXTREMITY, UNSPECIFIED VEIN (H): ICD-10-CM

## 2017-09-15 LAB
CHOLEST SERPL-MCNC: 153 MG/DL
GLUCOSE SERPL-MCNC: 87 MG/DL (ref 70–99)
HDLC SERPL-MCNC: 48 MG/DL
LDLC SERPL CALC-MCNC: 93 MG/DL
NONHDLC SERPL-MCNC: 105 MG/DL
SPECIMEN SOURCE: NORMAL
TRIGL SERPL-MCNC: 59 MG/DL
TSH SERPL DL<=0.005 MIU/L-ACNC: 0.56 MU/L (ref 0.4–4)
WET PREP SPEC: NORMAL

## 2017-09-15 PROCEDURE — 82947 ASSAY GLUCOSE BLOOD QUANT: CPT | Performed by: NURSE PRACTITIONER

## 2017-09-15 PROCEDURE — 90715 TDAP VACCINE 7 YRS/> IM: CPT | Performed by: NURSE PRACTITIONER

## 2017-09-15 PROCEDURE — 87210 SMEAR WET MOUNT SALINE/INK: CPT | Performed by: NURSE PRACTITIONER

## 2017-09-15 PROCEDURE — 90471 IMMUNIZATION ADMIN: CPT | Performed by: NURSE PRACTITIONER

## 2017-09-15 PROCEDURE — 84443 ASSAY THYROID STIM HORMONE: CPT | Performed by: NURSE PRACTITIONER

## 2017-09-15 PROCEDURE — 99395 PREV VISIT EST AGE 18-39: CPT | Mod: 25 | Performed by: NURSE PRACTITIONER

## 2017-09-15 PROCEDURE — 80061 LIPID PANEL: CPT | Performed by: NURSE PRACTITIONER

## 2017-09-15 PROCEDURE — 36415 COLL VENOUS BLD VENIPUNCTURE: CPT | Performed by: NURSE PRACTITIONER

## 2017-09-15 RX ORDER — FLUCONAZOLE 150 MG/1
150 TABLET ORAL ONCE
Qty: 2 TABLET | Refills: 0 | Status: SHIPPED | OUTPATIENT
Start: 2017-09-15 | End: 2017-09-15

## 2017-09-15 ASSESSMENT — PAIN SCALES - GENERAL: PAINLEVEL: NO PAIN (0)

## 2017-09-15 ASSESSMENT — PATIENT HEALTH QUESTIONNAIRE - PHQ9: SUM OF ALL RESPONSES TO PHQ QUESTIONS 1-9: 7

## 2017-09-15 ASSESSMENT — ANXIETY QUESTIONNAIRES: GAD7 TOTAL SCORE: 16

## 2017-09-15 NOTE — MR AVS SNAPSHOT
After Visit Summary   9/15/2017    Sandy Floyd    MRN: [id number]           Patient Information     Date Of Birth          1975        Visit Information        Provider Department      9/15/2017 8:00 AM Lauren Mathew APRN [facility]        Today's Diagnoses     Encounter for routine adult health examination with abnormal findings    -  1    Acute deep vein thrombosis (DVT) of left lower extremity, unspecified vein (H)        Encounter for screening mammogram for breast cancer        Need for vaccination        Vaginal irritation          Care Instructions      Preventive Health Recommendations  Female Ages 26 - 39  Yearly exam:   See your health care provider every year in order to    Review health changes.     Discuss preventive care.      Review your medicines if you your doctor has prescribed any.    Until age 30: Get a Pap test every three years (more often if you have had an abnormal result).    After age 30: Talk to your doctor about whether you should have a Pap test every 3 years or have a Pap test with HPV screening every 5 years.   You do not need a Pap test if your uterus was removed (hysterectomy) and you have not had cancer.  You should be tested each year for STDs (sexually transmitted diseases), if you're at risk.   Talk to your provider about how often to have your cholesterol checked.  If you are at risk for diabetes, you should have a diabetes test (fasting glucose).  Shots: Get a flu shot each year. Get a tetanus shot every 10 years.   Nutrition:     Eat at least 5 servings of fruits and vegetables each day.    Eat whole-grain bread, whole-wheat pasta and brown rice instead of white grains and rice.    Talk to your provider about Calcium and Vitamin D.     Lifestyle    Exercise at least 150 minutes a week (30 minutes a day, 5 days of the week). This will help you control your weight and prevent disease.    Limit alcohol to one drink per day.    No  smoking.     Wear sunscreen to prevent skin cancer.    See your dentist every six months for an exam and cleaning.    Preventive Health Recommendations  Female Ages 26 - 39  Yearly exam:   See your health care provider every year in order to    Review health changes.     Discuss preventive care.      Review your medicines if you your doctor has prescribed any.    Until age 30: Get a Pap test every three years (more often if you have had an abnormal result).    After age 30: Talk to your doctor about whether you should have a Pap test every 3 years or have a Pap test with HPV screening every 5 years.   You do not need a Pap test if your uterus was removed (hysterectomy) and you have not had cancer.  You should be tested each year for STDs (sexually transmitted diseases), if you're at risk.   Talk to your provider about how often to have your cholesterol checked.  If you are at risk for diabetes, you should have a diabetes test (fasting glucose).  Shots: Get a flu shot each year. Get a tetanus shot every 10 years.   Nutrition:     Eat at least 5 servings of fruits and vegetables each day.    Eat whole-grain bread, whole-wheat pasta and brown rice instead of white grains and rice.    Talk to your provider about Calcium and Vitamin D.     Lifestyle    Exercise at least 150 minutes a week (30 minutes a day, 5 days of the week). This will help you control your weight and prevent disease.    Limit alcohol to one drink per day.    No smoking.     Wear sunscreen to prevent skin cancer.    See your dentist every six months for an exam and cleaning.            Follow-ups after your visit        Future tests that were ordered for you today     Open Future Orders        Priority Expected Expires Ordered    *MA Screening Digital Bilateral Routine  9/15/2018 9/15/2017            Who to contact     If you have questions or need follow up information about today's clinic visit or your schedule please contact [facility]  "directly at [phone].  Normal or non-critical lab and imaging results will be communicated to you by MyChart, letter or phone within 4 business days after the clinic has received the results. If you do not hear from us within 7 days, please contact the clinic through [facility] or phone. If you have a critical or abnormal lab result, we will notify you by phone as soon as possible.  Submit refill requests through [facility] or call your pharmacy and they will forward the refill request to us. Please allow 3 business days for your refill to be completed.          Additional Information About Your Visit        [facility] Information     [facility] gives you secure access to your electronic health record. If you see a primary care provider, you can also send messages to your care team and make appointments. If you have questions, please call your primary care clinic.  If you do not have a primary care provider, please call [phone] and they will assist you.        Care EveryWhere ID     This is your Care EveryWhere ID. This could be used by other organizations to access your [facility] medical records  [id number]        Your Vitals Were     Pulse Temperature Respirations Height Last Period Breastfeeding?    68 97.9  F (36.6  C) (Temporal) 16 5' 2.25\" (1.581 m) 09/04/2017 (Exact Date) No    BMI (Body Mass Index)                   37.81 kg/m2            Blood Pressure from Last 3 Encounters:   09/15/17 112/70   08/31/17 106/80   08/18/17 104/74    Weight from Last 3 Encounters:   09/15/17 208 lb 6.4 oz (94.5 kg)   08/31/17 209 lb (94.8 kg)   05/19/17 210 lb (95.3 kg)              We Performed the Following     1st  Administration  [15402]     Glucose     Lipid panel reflex to direct LDL     TDAP VACCINE (ADACEL) [52506.002]     TSH with free T4 reflex     Wet prep          Today's Medication Changes          These changes are accurate as of: 9/15/17  5:20 PM.  If you have any questions, ask your nurse or doctor.             "   Start taking these medicines.        Dose/Directions    fluconazole 150 MG tablet   Commonly known as:  DIFLUCAN   Used for:  Vaginal irritation   Started by:  Lauren Mathew APRN CNP        Dose:  150 mg   Take 1 tablet (150 mg) by mouth once for 1 dose May repeat in 3-4 days if needed.   Quantity:  2 tablet   Refills:  0            Where to get your medicines      These medications were sent to [facility] - [place], [place] - [address]  [address], [facility] [address]     Phone:  [phone]     fluconazole 150 MG tablet                Primary Care Provider Office Phone # Fax #    JAVID Mei [phone] [address] [address]        Equal Access to Services     [facility] : Hadii jerry scott hadasho Soomaali, waaxda luqadaha, qaybta kaalmada adeegyada, irish thompson . So [facility] [phone].    ATENCIÓN: Si habla español, tiene a phillips disposición servicios gratuitos de asistencia lingüística. [facility] [phone].    We comply with applicable federal civil rights laws and [place] laws. We do not discriminate on the basis of race, color, national origin, age, disability sex, sexual orientation or gender identity.            Thank you!     Thank you for choosing [facility]  for your care. Our goal is always to provide you with excellent care. Hearing back from our patients is one way we can continue to improve our services. Please take a few minutes to complete the written survey that you may receive in the mail after your visit with us. Thank you!             Your Updated Medication List - Protect others around you: Learn how to safely use, store and throw away your medicines at www.disposemymeds.org.          This list is accurate as of: 9/15/17  5:20 PM.  Always use your most recent med list.                   Brand Name Dispense Instructions for use Diagnosis    CLARAVIS 40 MG capsule   Generic drug:   ISOtretinoin           dabigatran ANTICOAGULANT 150 MG capsule    PRADAXA    180 capsule    Take 1 capsule (150 mg) by mouth twice daily. Store in original 's bottle or blister pack; use within 120 days of opening.    Acute deep vein thrombosis (DVT) of other specified vein of left lower extremity (H)       DAILY VITAMINS PO           FISH OIL OMEGA-3 PO      Take 1 tablet by mouth daily        fluconazole 150 MG tablet    DIFLUCAN    2 tablet    Take 1 tablet (150 mg) by mouth once for 1 dose May repeat in 3-4 days if needed.    Vaginal irritation       FLUoxetine 40 MG capsule    PROzac    90 capsule    Take 1 capsule (40 mg) by mouth daily    Major depressive disorder, recurrent episode, moderate (H)       VITAMIN D3 PO      Take 1 tablet by mouth daily

## 2017-09-15 NOTE — NURSING NOTE
"Chief Complaint   Patient presents with     Physical     Panel Management     phq9, lizzy, tetanus, flu       Initial /70  Pulse 68  Temp 97.9  F (36.6  C) (Temporal)  Resp 16  Ht 5' 2.25\" (1.581 m)  Wt 208 lb 6.4 oz (94.5 kg)  LMP 09/04/2017 (Exact Date)  Breastfeeding? No  BMI 37.81 kg/m2 Estimated body mass index is 37.81 kg/(m^2) as calculated from the following:    Height as of this encounter: 5' 2.25\" (1.581 m).    Weight as of this encounter: 208 lb 6.4 oz (94.5 kg).  Medication Reconciliation: complete    "

## 2017-09-15 NOTE — PROGRESS NOTES
SUBJECTIVE:   CC: Sandy Floyd is an 41 year old woman who presents for preventive health visit.     Physical   Annual:     Getting at least 3 servings of Calcium per day::  Yes    Bi-annual eye exam::  NO    Dental care twice a year::  Yes    Sleep apnea or symptoms of sleep apnea::  None    Diet::  Regular (no restrictions)    Frequency of exercise::  2-3 days/week    Duration of exercise::  15-30 minutes    Taking medications regularly::  Yes    Medication side effects::  None    Additional concerns today::  YES (Check her blood clot. Yeast infection sx.)    MS/CV  The patient reports alleviation of the pain in her leg from recent blood clot. She explains that she can work out for about an hour and it still feels fine. She also notes a red [place] in the area around the clot and denies pain upon palpation of the area.       The patient states that she was recently diagnosed with a vaginal yeast infection and took antibiotics for it on Monday night. She reports that her pruritis has alleviated but not resolved with medication.     Skin  The patient notes erythema and bumps around her mouth that presented around the time she had the yeast infection.     Mood  The patient states that she was extremely anxious about her breast lump, which is why she requested a mammogram.     Weight   The patient denies making an effort to control her weight recently.     Today's PHQ-2 Score:   PHQ-2 ( 1999 Pfizer) 9/11/2017   Q1: Little interest or pleasure in doing things 1   Q2: Feeling down, depressed or hopeless 1   PHQ-2 Score 2   Q1: Little interest or pleasure in doing things Several days   Q2: Feeling down, depressed or hopeless Several days   PHQ-2 Score 2     Abuse: Current or Past(Physical, Sexual or Emotional)- No  Do you feel safe in your environment - Yes    Social History   Substance Use Topics     Smoking status: Never Smoker     Smokeless tobacco: Never Used      Comment: I don't smoke.     Alcohol use  No     The patient does not drink >3 drinks per day nor >7 drinks per week.    Reviewed orders with patient.  Reviewed health maintenance and updated orders accordingly - Yes  Labs reviewed in EPIC    Mammo discussed, and ordered as the patient is turning 42 years old soon and has a breast lump.     Pertinent mammograms are reviewed under the imaging tab.  History of abnormal Pap smear: NO - age 30-65 PAP every 5 years with negative HPV co-testing recommended    Reviewed and updated as needed this visit by clinical staff  Tobacco  Allergies  Meds  Med Hx  Surg Hx  Fam Hx  Soc Hx      Reviewed and updated as needed this visit by Provider        Past Medical History:   Diagnosis Date     Arthritis      Back pain      Cancer (H)      Cholelithiasis      Condyloma acuminatum 2003    general laser, no abnormal paps, last 9-1-06     Congestive heart failure, unspecified      Depressive disorder, not elsewhere classified      Diabetes (H)      Generalized anxiety disorder      Hypertension      Obesity, unspecified      Pancreatitis acute 8/12/09      Past Surgical History:   Procedure Laterality Date     CHOLECYSTECTOMY, LAPOROSCOPIC  3/15/11    Cholecystectomy, Laparoscopic     COSMETIC SURGERY       HC REDUCTION OF LARGE BREAST  3/11/09     SURGICAL HISTORY OF -   2003    laser tx on condyloma       ROS:  C: NEGATIVE for fever, chills, change in weight (See HPI above)  I: NEGATIVE for worrisome rashes, moles or lesions  E: NEGATIVE for vision changes or irritation  ENT: NEGATIVE for ear, mouth and throat problems  R: NEGATIVE for significant cough or SOB  B: NEGATIVE for tenderness or discharge (See HPI above)  CV: NEGATIVE for chest pain, palpitations or peripheral edema  GI: NEGATIVE for nausea, abdominal pain, heartburn, or change in bowel habits  : NEGATIVE Periods are regular. (See HPI above)  M: NEGATIVE for significant arthralgias or myalgia (See HPI above)  N: NEGATIVE for weakness, dizziness or  "paresthesias  P: NEGATIVE for changes in mood or affect     This document serves as a record of the services and decisions personally performed and made by Lauren Mathew DNP. It was created on her behalf by Ki Winchester, a trained medical scribe. The creation of this document is based on the provider's statements to the medical scribe.  Ki Winchester 8:16 AM September 15, 2017    OBJECTIVE:   /70  Pulse 68  Temp 97.9  F (36.6  C) (Temporal)  Resp 16  Ht 5' 2.25\" (1.581 m)  Wt 208 lb 6.4 oz (94.5 kg)  LMP 09/04/2017 (Exact Date)  Breastfeeding? No  BMI 37.81 kg/m2  EXAM:  GENERAL: healthy, alert and no distress  EYES: Eyes grossly normal to inspection, PERRL and conjunctivae and sclerae normal  HENT: ear canals and TM's normal, nose and mouth without ulcers or lesions  NECK: no adenopathy, no asymmetry, masses, or scars and thyroid normal to palpation  RESP: lungs clear to auscultation - no rales, rhonchi or wheezes  BREAST: normal without masses, tenderness or nipple discharge and no palpable axillary masses or adenopathy  CV: regular rate and rhythm, normal S1 S2, no S3 or S4, no murmur, click or rub, no peripheral edema and peripheral pulses strong  ABDOMEN: soft, nontender, no hepatosplenomegaly, no masses and bowel sounds normal   (female): normal female external genitalia, normal urethral meatus, vaginal mucosa pink, moist, well rugated, and normal cervix/adnexa/uterus without masses with moderate white thick discharge  MS: no gross musculoskeletal defects noted, no edema  SKIN: no suspicious lesions or rashes  NEURO: Normal strength and tone, mentation intact and speech normal  PSYCH: mentation appears normal, affect normal/bright    ASSESSMENT/PLAN:       ICD-10-CM    1. Encounter for routine adult health examination with abnormal findings Z00.01 TSH with free T4 reflex     Glucose     Lipid panel reflex to direct LDL     Wet prep     *MA Screening Digital Bilateral   2. Acute deep vein " "thrombosis (DVT) of left lower extremity, unspecified vein (H) I82.402    3. Encounter for screening mammogram for breast cancer Z12.31 *MA Screening Digital Bilateral   4. Need for vaccination Z23 TDAP VACCINE (ADACEL) [22438.002]     1st  Administration  [53912]   5. Vaginal irritation N89.8 fluconazole (DIFLUCAN) 150 MG tablet     Labs ordered for thyroid function, hyperlipidemia, and blood glucose regulation (see above).    Patient was informed that after 3 months with treatment of clot and resolution of symptoms, no more imaging of the clots is necessary.     Mammogram ordered and patient was told where she needs to schedule this imaging. Patient understands her instructions for scheduling.     Discussed using hydrocortisone for bumpiness around the mouth and return to the clinic if it doesn't resolve for treatment with antibiotics.     Treating for vaginal yeast- resolving    Special emphasis was placed on healthy diet and exercise to control the patient's BMI.     Patient was offered a tetanus vaccination and opted to receive it today. Side effects of vaccination were discussed, and patient consented to administration. Tetanus vaccination administered in the clinic.     Patient was offered an influenza vaccination and opted not to receive it today.     COUNSELING:  Reviewed preventive health counseling, as reflected in patient instructions       Regular exercise       Healthy diet/nutrition       Contraception       Safe sex practices/STD prevention     reports that she has never smoked. She has never used smokeless tobacco.    Estimated body mass index is 37.81 kg/(m^2) as calculated from the following:    Height as of this encounter: 5' 2.25\" (1.581 m).    Weight as of this encounter: 208 lb 6.4 oz (94.5 kg).   Weight management plan: Discussed healthy diet and exercise guidelines and patient will follow up in 12 months in clinic to re-evaluate.    Counseling Resources:  ATP IV Guidelines  Pooled Cohorts " Equation Calculator  Breast Cancer Risk Calculator  FRAX Risk Assessment  ICSI Preventive Guidelines  Dietary Guidelines for Americans, 2010  USDA's MyPlate  ASA Prophylaxis  Lung CA Screening    The information in this document, created by the medical scribe for me, accurately reflects the services I personally performed and the decisions made by me. I have reviewed and approved this document for accuracy prior to leaving the patient care area.  September 15, 2017 8:29 AM    JAVID Ordonez [facility]

## 2017-09-15 NOTE — NURSING NOTE
Prior to injection verified patient identity using patient's name and date of birth.  Screening Questionnaire for Adult Immunization    Are you sick today?   No   Do you have allergies to medications, food, a vaccine component or latex?   No   Have you ever had a serious reaction after receiving a vaccination?   No   Do you have a long-term health problem with heart disease, lung disease, asthma, kidney disease, metabolic disease (e.g. diabetes), anemia, or other blood disorder?   No   Do you have cancer, leukemia, HIV/AIDS, or any other immune system problem?   No   In the past 3 months, have you taken medications that affect  your immune system, such as prednisone, other steroids, or anticancer drugs; drugs for the treatment of rheumatoid arthritis, Crohn s disease, or psoriasis; or have you had radiation treatments?   No   Have you had a seizure, or a brain or other nervous system problem?   No   During the past year, have you received a transfusion of blood or blood     products, or been given immune (gamma) globulin or antiviral drug?   No   For women: Are you pregnant or is there a chance you could become        pregnant during the next month?   No   Have you received any vaccinations in the past 4 weeks?   No     Immunization questionnaire answers were all negative.        Per orders of Lauren Mathew, injection of Tdap given by Harmony Valenzuela. Patient instructed to remain in clinic for 15 minutes afterwards, and to report any adverse reaction to me immediately.       Screening performed by Harmony Valenzuela on 9/15/2017 at 9:11 AM.

## 2017-09-19 ENCOUNTER — MYC MEDICAL ADVICE (OUTPATIENT)
Dept: FAMILY MEDICINE | Facility: CLINIC | Age: 42
End: 2017-09-19

## 2017-09-28 NOTE — TELEPHONE ENCOUNTER
[employer] message read with no response.  Will close encounter at this time.    Jason Cooper, RN, BSN

## 2017-09-28 NOTE — PROGRESS NOTES
"   SUBJECTIVE:   CC: Sandy Floyd is an 41 year old woman who presents for preventive health visit.     HPI        {additional problems to add (Optional):390010}    Today's PHQ-2 Score: PHQ-2 ( 1999 Pfizer) 9/11/2017   Q1: Little interest or pleasure in doing things 1   Q2: Feeling down, depressed or hopeless 1   PHQ-2 Score 2   Q1: Little interest or pleasure in doing things Several days   Q2: Feeling down, depressed or hopeless Several days   PHQ-2 Score 2       Abuse: Current or Past(Physical, Sexual or Emotional)- No  Do you feel safe in your environment - Yes    Social History   Substance Use Topics     Smoking status: Never Smoker     Smokeless tobacco: Never Used      Comment: I don't smoke.     Alcohol use No     The patient does not drink >3 drinks per day nor >7 drinks per week.    Reviewed orders with patient.  Reviewed health maintenance and updated orders accordingly - {Yes/No:841662::\"Yes\"}  {Chronicprobdata (Optional):240351}    {Mammo Decision Support (Optional):672727}    Pertinent mammograms are reviewed under the imaging tab.  History of abnormal Pap smear: {PAP HX:057316}    Reviewed and updated as needed this visit by clinical staff         Reviewed and updated as needed this visit by Provider        {HISTORY OPTIONS (Optional):722789}    ROS:  {FEMALE PREVENTATIVE ROS:778857}     OBJECTIVE:   LMP 09/04/2017 (Exact Date)  EXAM:  {Exam Choices:187988}    ASSESSMENT/PLAN:   {Diag Picklist:573273}    COUNSELING:  {FEMALE COUNSELING MESSAGES:448879::\"Reviewed preventive health counseling, as reflected in patient instructions\"}    {BP Counseling- Complete if BP >= 120/80  (Optional):647830}     reports that she has never smoked. She has never used smokeless tobacco.  {Tobacco Cessation -- Complete if patient is a smoker (Optional):296155}  Estimated body mass index is 37.81 kg/(m^2) as calculated from the following:    Height as of 9/15/17: 5' 2.25\" (1.581 m).    Weight as of 9/15/17: 208 lb " 6.4 oz (94.5 kg).   {Weight Management Plan (ACO) Complete if BMI is abnormal-  Ages 18-64  BMI >24.9.  Age 65+ with BMI <23 or >30 (Optional):847204}    Counseling Resources:  ATP IV Guidelines  Pooled Cohorts Equation Calculator  Breast Cancer Risk Calculator  FRAX Risk Assessment  ICSI Preventive Guidelines  Dietary Guidelines for Americans, 2010  USDA's MyPlate  ASA Prophylaxis  Lung CA Screening    JAVID Ordonez CNP  [facility]

## 2017-10-02 ENCOUNTER — OFFICE VISIT (OUTPATIENT)
Dept: FAMILY MEDICINE | Facility: CLINIC | Age: 42
End: 2017-10-02
Payer: COMMERCIAL

## 2017-10-02 VITALS
BODY MASS INDEX: 38.55 KG/M2 | RESPIRATION RATE: 16 BRPM | TEMPERATURE: 97.7 F | HEIGHT: 62 IN | WEIGHT: 209.5 LBS | DIASTOLIC BLOOD PRESSURE: 80 MMHG | SYSTOLIC BLOOD PRESSURE: 104 MMHG | OXYGEN SATURATION: 100 % | HEART RATE: 83 BPM

## 2017-10-02 DIAGNOSIS — N89.8 VAGINAL IRRITATION: Primary | ICD-10-CM

## 2017-10-02 PROCEDURE — 99213 OFFICE O/P EST LOW 20 MIN: CPT | Performed by: NURSE PRACTITIONER

## 2017-10-02 ASSESSMENT — PAIN SCALES - GENERAL: PAINLEVEL: NO PAIN (0)

## 2017-10-02 NOTE — PROGRESS NOTES
SUBJECTIVE:   Sandy Floyd is a 41 year old female who presents to clinic today for the following health issues:    Vaginal Symptoms  Onset: ongoing for one month     Description:  Vaginal Discharge: in the beginning was creamy, currently  now no discharge  Itching (Pruritis): no   Burning sensation:  YES  Odor: no     Accompanying Signs & Symptoms:  Pain with Urination: no   Abdominal Pain: no   Fever: no     History:   Sexually active: currently no   New Partner: no   Possibility of Pregnancy:  No    Precipitating factors:   Recent Antibiotic Use: no     Alleviating factors:  See below     Therapies Tried and outcome:  Cortisone and antifungal cream, no improvement.     Patient reports for ongoing vaginal symptoms for one month. She is experiencing itchiness on her external vaginal area. She has tried antifungal cream for a few days. She noticed at first it burned, and then the burning went away. Patient is not wearing underwear at night. She relates that she is shaving in that area. She has noticed after she shaves she experiences itchiness.       Problem list and histories reviewed & adjusted, as indicated.  Additional history: as documented    Patient Active Problem List   Diagnosis     Back pain     Obesity     Pancreatitis, acute     Cholelithiasis     Dyspareunia     Pain in joint, pelvic region and thigh     Muscle weakness (generalized)     Moderate recurrent major depression (H)     CARDIOVASCULAR SCREENING; LDL GOAL LESS THAN 160     Anxiety state     Plantar fasciitis     Acne, unspecified acne type     Acute deep vein thrombosis (DVT) of left lower extremity, unspecified vein (H)     Past Surgical History:   Procedure Laterality Date     CHOLECYSTECTOMY, LAPOROSCOPIC  3/15/11    Cholecystectomy, Laparoscopic     COSMETIC SURGERY       HC REDUCTION OF LARGE BREAST  3/11/09     SURGICAL HISTORY OF -   2003    laser tx on condyloma       Social History   Substance Use Topics     Smoking status:  Never Smoker     Smokeless tobacco: Never Used      Comment: I don't smoke.     Alcohol use No     Family History   Problem Relation Age of Onset     CANCER Paternal Grandfather      unknown     Arthritis Maternal Grandmother      CANCER Father      prostate cancer     Prostate Cancer Father      Circulatory Maternal Grandfather      blood clots     Other Cancer Maternal Grandfather      Arthritis Paternal Grandmother      CANCER Brother      Testicular cancer     DIABETES Other      Hypertension Other      Asthma No family hx of      C.A.D. No family hx of      CEREBROVASCULAR DISEASE No family hx of      Breast Cancer No family hx of      Cancer - colorectal No family hx of      Alzheimer Disease No family hx of      Blood Disease No family hx of      Cardiovascular No family hx of      HEART DISEASE No family hx of      Eye Disorder No family hx of      GASTROINTESTINAL DISEASE No family hx of      Genitourinary Problems No family hx of      Lipids No family hx of      Musculoskeletal Disorder No family hx of      Neurologic Disorder No family hx of      Respiratory No family hx of      Thyroid Disease No family hx of          Current Outpatient Prescriptions   Medication Sig Dispense Refill     dabigatran ANTICOAGULANT (PRADAXA) 150 MG capsule Take 1 capsule (150 mg) by mouth twice daily. Store in original 's bottle or blister pack; use within 120 days of opening. 180 capsule 0     CLARAVIS 40 MG capsule   0     Cholecalciferol (VITAMIN D3 PO) Take 1 tablet by mouth daily       Omega-3 Fatty Acids (FISH OIL OMEGA-3 PO) Take 1 tablet by mouth daily        FLUoxetine (PROZAC) 40 MG capsule Take 1 capsule (40 mg) by mouth daily 90 capsule 3     Multiple Vitamin (DAILY VITAMINS PO)            Reviewed and updated as needed this visit by clinical staffTobacco  Allergies  Med Hx  Surg Hx  Fam Hx  Soc Hx      Reviewed and updated as needed this visit by Provider         ROS:  Constitutional, neuro,  "ENT, endocrine, pulmonary, cardiac, gastrointestinal, genitourinary, musculoskeletal, integument and psychiatric systems are negative, except as otherwise noted.    This document serves as a record of the services and decisions personally performed and made by Lauren Mathew DNP. It was created on her behalf by Roseann Barroso, a trained medical scribe. The creation of this document is based on the provider's statements to the medical scribe.  Roseann Barroso 8:52 AM October 2, 2017    OBJECTIVE:                                                    /80  Pulse 83  Temp 97.7  F (36.5  C)  Resp 16  Ht 5' 1.69\" (1.567 m)  Wt 209 lb 8 oz (95 kg)  LMP 10/01/2017  SpO2 100%  BMI 38.7 kg/m2  Body mass index is 38.7 kg/(m^2).  GENERAL APPEARANCE: healthy, alert and no distress   (female): mild follicular irritation on external genitalia, no large open lesions, some small micro tears, small amount of shear appearance to groin folds, otherwise normal cervix, adnexae, and uterus without masses or discharge and rectal exam normal without masses-guaiac negative stool  NEURO: Normal strength and tone, mentation intact and speech normal  PSYCH: mentation appears normal and affect normal/bright    Diagnostic test results:  Diagnostic Test Results:  No results found for this or any previous visit (from the past 24 hour(s)).       ASSESSMENT/PLAN:                                                        ICD-10-CM    1. Vaginal irritation N89.8      Evaluated external vaginal irritation. Discussed with patient her shaving regiment. Counseled patient not to shave too often to avoid irritation from shaving. Advised to be careful with the type of razor she uses. Instructed patient to use hydrocortisone and a light oil moisturizer on affected area.    Offered patient Flu shot, deferred by patient.     Follow up with Provider - Return to clinic if symptoms worsen, otherwise as needed.      All questions invited, asked and answered to " the patient's apparent satisfaction.  Patient agrees to plan.     The information in this document, created by the medical scribe for me, accurately reflects the services I personally performed and the decisions made by me. I have reviewed and approved this document for accuracy prior to leaving the patient care area.  October 2, 2017 8:52 AM    JAVID Ordonez [facility]

## 2017-10-02 NOTE — MR AVS SNAPSHOT
After Visit Summary   10/2/2017    Sandy Floyd    MRN: [id number]           Patient Information     Date Of Birth          1975        Visit Information        Provider Department      10/2/2017 8:00 AM Lauren Mathew APRN [facility]        Today's Diagnoses     Vaginal irritation    -  1      Care Instructions      Preventive Health Recommendations  Female Ages 26 - 39  Yearly exam:   See your health care provider every year in order to    Review health changes.     Discuss preventive care.      Review your medicines if you your doctor has prescribed any.    Until age 30: Get a Pap test every three years (more often if you have had an abnormal result).    After age 30: Talk to your doctor about whether you should have a Pap test every 3 years or have a Pap test with HPV screening every 5 years.   You do not need a Pap test if your uterus was removed (hysterectomy) and you have not had cancer.  You should be tested each year for STDs (sexually transmitted diseases), if you're at risk.   Talk to your provider about how often to have your cholesterol checked.  If you are at risk for diabetes, you should have a diabetes test (fasting glucose).  Shots: Get a flu shot each year. Get a tetanus shot every 10 years.   Nutrition:     Eat at least 5 servings of fruits and vegetables each day.    Eat whole-grain bread, whole-wheat pasta and brown rice instead of white grains and rice.    Talk to your provider about Calcium and Vitamin D.     Lifestyle    Exercise at least 150 minutes a week (30 minutes a day, 5 days of the week). This will help you control your weight and prevent disease.    Limit alcohol to one drink per day.    No smoking.     Wear sunscreen to prevent skin cancer.    See your dentist every six months for an exam and cleaning.            Follow-ups after your visit        Follow-up notes from your care team     Return if symptoms worsen or fail to improve.     "  Who to contact     If you have questions or need follow up information about today's clinic visit or your schedule please contact [facility] directly at [phone].  Normal or non-critical lab and imaging results will be communicated to you by MyChart, letter or phone within 4 business days after the clinic has received the results. If you do not hear from us within 7 days, please contact the clinic through [employer] or phone. If you have a critical or abnormal lab result, we will notify you by phone as soon as possible.  Submit refill requests through [employer] or call your pharmacy and they will forward the refill request to us. Please allow 3 business days for your refill to be completed.          Additional Information About Your Visit        Splash Information     Splash gives you secure access to your electronic health record. If you see a primary care provider, you can also send messages to your care team and make appointments. If you have questions, please call your primary care clinic.  If you do not have a primary care provider, please call [phone] and they will assist you.        Care EveryWhere ID     This is your Care EveryWhere ID. This could be used by other organizations to access your [facility] medical records  [id number]        Your Vitals Were     Pulse Temperature Respirations Height Last Period Pulse Oximetry    83 97.7  F (36.5  C) 16 5' 1.69\" (1.567 m) 10/01/2017 100%    BMI (Body Mass Index)                   38.7 kg/m2            Blood Pressure from Last 3 Encounters:   10/02/17 104/80   09/15/17 112/70   08/31/17 106/80    Weight from Last 3 Encounters:   10/02/17 209 lb 8 oz (95 kg)   09/15/17 208 lb 6.4 oz (94.5 kg)   08/31/17 209 lb (94.8 kg)              Today, you had the following     No orders found for display       Primary Care Provider Office Phone # Fax #    JAVID Mei [facility] [phone]       [address] [place] [address]      "   Equal Access to Services     [facility]: Hadii aad ku haderickaluiza Sabasali, wapetrada luqadaha, qaybta jennifercristopherirish yang. So [facility] [phone].    ATENCIÓN: Si habla español, tiene a phillips disposición servicios gratuitos de asistencia lingüística. Llame al [phone].    We comply with applicable federal civil rights laws and [place] laws. We do not discriminate on the basis of race, color, national origin, age, disability, sex, sexual orientation, or gender identity.            Thank you!     Thank you for choosing [facility]  for your care. Our goal is always to provide you with excellent care. Hearing back from our patients is one way we can continue to improve our services. Please take a few minutes to complete the written survey that you may receive in the mail after your visit with us. Thank you!             Your Updated Medication List - Protect others around you: Learn how to safely use, store and throw away your medicines at www.disposemymeds.org.          This list is accurate as of: 10/2/17 11:31 AM.  Always use your most recent med list.                   Brand Name Dispense Instructions for use Diagnosis    CLARAVIS 40 MG capsule   Generic drug:  ISOtretinoin           dabigatran ANTICOAGULANT 150 MG capsule    PRADAXA    180 capsule    Take 1 capsule (150 mg) by mouth twice daily. Store in original 's bottle or blister pack; use within 120 days of opening.    Acute deep vein thrombosis (DVT) of other specified vein of left lower extremity (H)       DAILY VITAMINS PO           FISH OIL OMEGA-3 PO      Take 1 tablet by mouth daily        FLUoxetine 40 MG capsule    PROzac    90 capsule    Take 1 capsule (40 mg) by mouth daily    Major depressive disorder, recurrent episode, moderate (H)       VITAMIN D3 PO      Take 1 tablet by mouth daily

## 2017-10-02 NOTE — NURSING NOTE
"Chief Complaint   Patient presents with     Physical     Panel Management     flu shot       Initial /80  Pulse 83  Temp 97.7  F (36.5  C)  Resp 16  Ht 5' 1.69\" (1.567 m)  Wt 209 lb 8 oz (95 kg)  LMP 10/01/2017  SpO2 100%  BMI 38.7 kg/m2 Estimated body mass index is 38.7 kg/(m^2) as calculated from the following:    Height as of this encounter: 5' 1.69\" (1.567 m).    Weight as of this encounter: 209 lb 8 oz (95 kg).  Medication Reconciliation: complete       Andra Harrison MA       "

## 2017-10-05 ENCOUNTER — TELEPHONE (OUTPATIENT)
Dept: FAMILY MEDICINE | Facility: CLINIC | Age: 42
End: 2017-10-05

## 2017-10-05 NOTE — LETTER
[facility]  [address]  [place] [address]  [phone]      October 5, 2017    Sandy Floyd                                                                                                                     [address] [address]        Dear Sandy,    We received a notice that you are to be scheduled with a specialty clinic. The referral has been placed by your provider and you can call to schedule an appointment directly.     Enclosed, you will find the referral with the phone number to call to schedule an appointment.  If you have already scheduled this, you may disregard this letter.    Please call us if you have any questions or concerns.      Sincerely,     [facility] Support Staff / ravi

## 2017-10-05 NOTE — TELEPHONE ENCOUNTER
You placed a referral for patient to onc/heme on 8/31/17.  Patient has not scheduled as of yet.      Please review and forward to team if follow up with the patient is needed.     Thank you!  Cassandra/Clinic Referrals Dyad II

## 2017-12-01 ENCOUNTER — TELEPHONE (OUTPATIENT)
Dept: ONCOLOGY | Facility: CLINIC | Age: 42
End: 2017-12-01

## 2017-12-01 NOTE — TELEPHONE ENCOUNTER
Patient called left message on patient's voice mail confirming her appointment 12/4/17 with Dr. Cotter, directions to clinic given. Mary Joyner RN

## 2017-12-04 ENCOUNTER — RADIANT APPOINTMENT (OUTPATIENT)
Dept: ULTRASOUND IMAGING | Facility: CLINIC | Age: 42
End: 2017-12-04
Attending: NURSE PRACTITIONER
Payer: COMMERCIAL

## 2017-12-04 ENCOUNTER — TELEPHONE (OUTPATIENT)
Dept: ONCOLOGY | Facility: CLINIC | Age: 42
End: 2017-12-04

## 2017-12-04 ENCOUNTER — ONCOLOGY VISIT (OUTPATIENT)
Dept: ONCOLOGY | Facility: CLINIC | Age: 42
End: 2017-12-04
Payer: COMMERCIAL

## 2017-12-04 VITALS
BODY MASS INDEX: 40.62 KG/M2 | SYSTOLIC BLOOD PRESSURE: 118 MMHG | HEART RATE: 67 BPM | OXYGEN SATURATION: 100 % | DIASTOLIC BLOOD PRESSURE: 79 MMHG | TEMPERATURE: 98.3 F | WEIGHT: 219.9 LBS

## 2017-12-04 DIAGNOSIS — I82.402 ACUTE DEEP VEIN THROMBOSIS (DVT) OF LEFT LOWER EXTREMITY, UNSPECIFIED VEIN (H): Primary | ICD-10-CM

## 2017-12-04 DIAGNOSIS — I82.492 ACUTE DEEP VEIN THROMBOSIS (DVT) OF OTHER SPECIFIED VEIN OF LEFT LOWER EXTREMITY (H): ICD-10-CM

## 2017-12-04 PROCEDURE — 85730 THROMBOPLASTIN TIME PARTIAL: CPT | Performed by: INTERNAL MEDICINE

## 2017-12-04 PROCEDURE — 85303 CLOT INHIBIT PROT C ACTIVITY: CPT | Performed by: INTERNAL MEDICINE

## 2017-12-04 PROCEDURE — 85306 CLOT INHIBIT PROT S FREE: CPT | Performed by: INTERNAL MEDICINE

## 2017-12-04 PROCEDURE — 81240 F2 GENE: CPT | Performed by: INTERNAL MEDICINE

## 2017-12-04 PROCEDURE — 85385 FIBRINOGEN ANTIGEN: CPT | Mod: 90 | Performed by: INTERNAL MEDICINE

## 2017-12-04 PROCEDURE — 36415 COLL VENOUS BLD VENIPUNCTURE: CPT | Performed by: INTERNAL MEDICINE

## 2017-12-04 PROCEDURE — 85307 ASSAY ACTIVATED PROTEIN C: CPT | Performed by: INTERNAL MEDICINE

## 2017-12-04 PROCEDURE — 86147 CARDIOLIPIN ANTIBODY EA IG: CPT | Performed by: INTERNAL MEDICINE

## 2017-12-04 PROCEDURE — 85300 ANTITHROMBIN III ACTIVITY: CPT | Performed by: INTERNAL MEDICINE

## 2017-12-04 PROCEDURE — 85613 RUSSELL VIPER VENOM DILUTED: CPT | Performed by: INTERNAL MEDICINE

## 2017-12-04 PROCEDURE — 93971 EXTREMITY STUDY: CPT | Mod: LT | Performed by: RADIOLOGY

## 2017-12-04 PROCEDURE — 99000 SPECIMEN HANDLING OFFICE-LAB: CPT | Performed by: INTERNAL MEDICINE

## 2017-12-04 PROCEDURE — 99204 OFFICE O/P NEW MOD 45 MIN: CPT | Performed by: INTERNAL MEDICINE

## 2017-12-04 PROCEDURE — 86146 BETA-2 GLYCOPROTEIN ANTIBODY: CPT | Performed by: INTERNAL MEDICINE

## 2017-12-04 PROCEDURE — 00000401 ZZHCL STATISTIC THROMBIN TIME NC: Performed by: INTERNAL MEDICINE

## 2017-12-04 PROCEDURE — 81241 F5 GENE: CPT | Performed by: INTERNAL MEDICINE

## 2017-12-04 PROCEDURE — 00000167 ZZHCL STATISTIC INR NC: Performed by: INTERNAL MEDICINE

## 2017-12-04 ASSESSMENT — PAIN SCALES - GENERAL: PAINLEVEL: NO PAIN (0)

## 2017-12-04 NOTE — NURSING NOTE
"Oncology Rooming Note    December 4, 2017 3:29 PM   Sandy Floyd is a 41 year old female who presents for:    Chief Complaint   Patient presents with     Oncology Clinic Visit     Initial Vitals: /79 (BP Location: Left arm, Cuff Size: Adult Large)  Pulse 67  Temp 98.3  F (36.8  C) (Oral)  Wt 99.7 kg (219 lb 14.4 oz)  SpO2 100%  BMI 40.62 kg/m2 Estimated body mass index is 40.62 kg/(m^2) as calculated from the following:    Height as of 10/2/17: 1.567 m (5' 1.69\").    Weight as of this encounter: 99.7 kg (219 lb 14.4 oz). Body surface area is 2.08 meters squared.  No Pain (0) Comment: Data Unavailable   No LMP recorded.  Allergies reviewed: Yes  Medications reviewed: Yes    Medications: Medication refills not needed today.  Pharmacy name entered into EPIC:    [facility] - FAX ONLY  [facility] - [place], [place] - [address]  [facility] - USE FOR MAILING ONLY - [place]    Clinical concerns: blood clots in leg Dr. Cotter was notified.    5 minutes for nursing intake (face to face time)     Rahat Valles RN              "

## 2017-12-04 NOTE — LETTER
12/4/2017         RE: Sandy Floyd  [address] [facility] [address]        Dear Colleague,    Thank you for referring your patient, Sandy Floyd, to the [facility]. Please see a copy of my visit note below.    [facility] CANCER [facility]    NEW PATIENT VISIT NOTE    PATIENT NAME: Sandy Floyd MRN # [id number]  DATE OF VISIT: December 4, 2017 YOB: 1975    REFERRING PROVIDER: JAVID Mei CNP  [address] [facility]  [place] [id number]     HISTORY OF PRESENT ILLNESS   Sandy Floyd is 41 year old female with no significant PMH who has been referred to Hematology post DVT     Sandy noticed pain in her legs about 3 months ago. It gradually got worse to a point that it was difficult to walk. She went to a clinic at work. She was referred to [facility] imaging for lower extremity. She was started on dabigatran and continues on this medication. Her pain and discomfort resolved within first week of starting the anticoagulation.     She started on accutane through a dermatologist about 4-5 months ago. She had to be on OCP to be on a accutane. She got the pain within a month or two of starting the OCP. She is off accutane and OCP. She had been on OCP in her 20's without any difficulty.     No family member with blood clots.     She has been referred to Hematology clinic for further thrombophilia work up.      PAST MEDICAL HISTORY   - DVT as above  - Depression - managed on fluoxetine  - No other chronic medical illness      CURRENT OUTPATIENT MEDICATIONS     Current Outpatient Prescriptions   Medication Sig     dabigatran ANTICOAGULANT (PRADAXA) 150 MG capsule Take 1 capsule (150 mg) by mouth twice daily. Store in original 's bottle or blister pack; use within 120 days of opening.     CLARAVIS 40 MG capsule      Cholecalciferol (VITAMIN D3 PO) Take 1 tablet by mouth daily     Omega-3 Fatty Acids (FISH OIL OMEGA-3 PO)  Take 1 tablet by mouth daily      FLUoxetine (PROZAC) 40 MG capsule Take 1 capsule (40 mg) by mouth daily     Multiple Vitamin (DAILY VITAMINS PO)      No current facility-administered medications for this visit.         ALLERGIES      Allergies   Allergen Reactions     No Known Drug Allergies         SOCIAL HISTORY     Social History     Social History Narrative    Dairy/d 3 servings/d.     Caffeine 0 servings/d    Exercise 0 x week    Sunscreen used - Yes    Seatbelts used - Yes    Working smoke/CO detectors in the home - Yes    Guns stored in the home - No    Self Breast Exams - Yes    Self Testicular Exam - NOT APPLICABLE    Eye Exam up to date - No    Dental Exam up to date - Yes    Pap Smear up to date - Yes    Mammogram up to date - YEs    PSA up to date - NOT APPLICABLE    Dexa Scan up to date - NOT APPLICABLE    Flex Sig / Colonoscopy up to date - NOT APPLICABLE    Immunizations up to date - Yes    Abuse: Current or Past(Physical, Sexual or Emotional)- No    Do you feel safe in your environment - Yes        Makenzie Miller, X-Ray Technician.    Td                 She is  and lives with her . She does not have kids.   She works in the [employer] at [employer].   She does not smoke cigarette. She is a never smoker. She has smoked occasional cigarettes in college.   She very rarely drinks alcohol.   She denies any recreational drug use.     FAMILY HISTORY   - Both grand parents had some form of cancer; one of them had prostate cancer  - Paternal grandmother  at age of 95 with CHF/dilated cardiomyopathy     REVIEW OF SYSTEMS   As above in the HPI, o/w complete 12-point ROS was negative.     PHYSICAL EXAM   B/P: 118/79, T: 98.3, P: 67, R: Data Unavailable  SpO2 Readings from Last 4 Encounters:   17 100%   10/02/17 100%   11 100%   11 100%     Wt Readings from Last 3 Encounters:   17 99.7 kg (219 lb 14.4 oz)   10/02/17 95 kg (209 lb 8 oz)   09/15/17 94.5 kg (208 lb  6.4 oz)     GEN: NAD  HEENT: PERRL, EOMI, no icterus, injection or pallor. Oropharynx is clear.  NECK: no cervical or supraclavicular lymphadenopathy  LUNGS: clear bilaterally  CV: regular, no murmurs, rubs, or gallops  ABDOMEN: soft, non-tender, non-distended, normal bowel sounds, no hepatosplenomegaly by percussion or palpation  EXT: warm, well perfused, no edema  NEURO: alert  SKIN: no rashes     LABORATORY AND IMAGING STUDIES     Recent Labs   Lab Test  09/15/17   0849  08/24/16   0936  03/01/13   1044  10/02/12   1650   POTASSIUM   --    --    --   4.2   BUN   --    --    --   16   CR   --    --    --   0.64   GLC  87  85  88   --      No results for input(s): MAG, PHOS in the last 98546 hours.  Recent Labs   Lab Test  03/08/11   1544   HGB  12.8     No results for input(s): BILITOTAL, ALKPHOS, ALT, AST, ALBUMIN, LDH in the last 40146 hours.  TSH   Date Value Ref Range Status   09/15/2017 0.56 0.40 - 4.00 mU/L Final   08/24/2016 1.42 0.40 - 4.00 mU/L Final   06/02/2011 2.09 0.4 - 5.0 mU/L Final     No results for input(s): CEA in the last 65994 hours.  Results for orders placed or performed in visit on 12/04/17   US Lower Extremity Venous Duplex Left    Narrative    EXAMINATION: US LOWER EXTREMITY VENOUS DUPLEX LEFT, 12/4/2017 7:20 AM     COMPARISON: Prior imaging is not available for comparison.    HISTORY: 41 years old with reported history of left calf DVT.    TECHNIQUE:  Gray-scale evaluation with compression, spectral flow, and  color Doppler assessment of the deep venous system of the left leg  from groin to knee, and then at the ankle.    FINDINGS:  In the left lower extremity, the common femoral, superficial femoral,  popliteal and posterior tibial veins demonstrate normal  compressibility and blood flow. There is normal compressibility,  phasicity and augmentation in the right common femoral vein.      Impression    IMPRESSION:  1.  No evidence left lower extremity deep venous thrombosis on  today's  exam. Correlation with outside study is recommended.    LESLIE GARSIA MD       Lab Results   Component Value Date    PATH  08/24/2016       Patient Name: GABI HENDRIX  MR#: [id number]  Specimen #: [id number]  Collected: 8/24/2016  Received: 8/24/2016  Reported: 8/25/2016 12:09  Ordering Phy(s): DARBY SALGADO    SPECIMEN/STAIN PROCESS:  Pap imaged thin layer prep screening (Surepath, FocalPoint with guided  screening)       Pap-Cyto x 1, Reflex HPV if NIL/ASCUS/LSIL x 1    SOURCE: Cervical, endocervical  ----------------------------------------------------------------   Pap imaged thin layer prep screening (Surepath, FocalPoint with guided  screening)  SPECIMEN ADEQUACY:  Satisfactory for evaluation.  -Transformation zone component absent.    CYTOLOGIC INTERPRETATION:    Negative for Intraepithelial Lesion or Malignancy    Electronically signed out by:  BIANCA Reynoso (ASCP)    Processed and screened at [facility]    CLINICAL HISTORY:  LMP: 7/24/2016  Previous normal pap  Date of Last Pap: 12/31/2013,    Papanicolaou Test Limitations:  Cervical cytology is a screening test  with limited sensitivity; regular screening is critical for cancer  prevention; Pap tests are primarily effective for the  diagnosis/prevention of squamous cell carcinoma, not adenocarcinomas or  other cancers.    TESTING LAB LOCATION:  [address]  [phone]    COLLECTION SITE:  Client:  General acute hospital  Location: [facility] ()         ASSESSMENT    1. Provoked lower extremity DVT after starting OCP in 41 year old female with obesity  2. Depression managed on fluoxetine  3. Recent treatment of acne with Accutane    DISCUSSION   I had a lengthy discussion with patient who is alone at this clinic visit. I tried to explain thrombophilia with risk factors, precipitating factors,  risk, prognosis and management.     I explained risk factors and other etiologies including immobility, inflammation, infections/sepsis, thrombophilia with inherited disorders.     By a rule of thumb anti-coagulation is recommended for period 3 months after the provoking factor is gone. If there is no known provoking factor or the risk is persistent (lifelong), then the recommended duration of anticoagulation is life long.     In her case, she started with oral contraceptives within a month prior to noticing pain in her leg. Oral contraceptives significantly increase the risk of DVT, especially after 39 years of age (Eur J Contracept Reprod Health Care. 2000;5(4):265). Obesity further increases the risk of DVT. Risk of DVT was 10 fold in female patients with obesity on OC as compared to those not on contraceptives. She should avoid oral contraceptives including hormone replacement therapy. I have also reviewed other risk factors including immobility and hospitalization. She should be extra careful if she is travelling long distances or even if she gets admitted to the hospital.     I do not see an extensive thrombophilia work up done, though this would change little for her care at this time. With advancing age, risk for clot would continue to steadily increase. If she does get recurrent DVT, she would need lifelong anticoagulation - irrespective of the thrombophilia work up.     Anticoagulants like coumadin do not dissolve clots. In fact it merely prevents further extension of the clot. Bodies own thrombolytic system does attempt to dissolve the clot. Since lower extremities have low flow system unlike the lungs, the clots often persist. Thrombolytics are available and can resolve clots but they carry a huge risk of bleeding. They are used in the setting of saddle thrombus or other hemodynamically significant thrombus. These agents are more commonly used in the setting of arterial thrombosis as in acute MI, acute  CVA or gangrene as the benefits of revascularization outweigh the risks of bleeding. In her case, her follow up US of lower extremity does not show any evidence of a DVT which is quite encouraging.        PLAN   - I will work up for her thrombophilia.  - [facility]  - I would not schedule a follow up at this time  - I would be happy to see her at a short notice if new issues arise or if she has any recurrent DVT    Over 45 min of direct face to face time spent with patient with more than 50% time spent in counseling and coordinating care.        Federico Cotter  Adj ,  Division of Hematology, Oncology & Transplantation  [facility].       Again, thank you for allowing me to participate in the care of your patient.        Sincerely,        Federico Cotter MD

## 2017-12-04 NOTE — PROGRESS NOTES
[facility] CANCER CLINIC    NEW PATIENT VISIT NOTE    PATIENT NAME: Sandy Floyd MRN # [id number]  DATE OF VISIT: December 4, 2017 YOB: 1975    REFERRING PROVIDER: JAVID Mei CNP  [address] [place] [address]     HISTORY OF PRESENT ILLNESS   Sandy Floyd is 41 year old female with no significant PMH who has been referred to Hematology post DVT     Sandy noticed pain in her legs about 3 months ago. It gradually got worse to a point that it was difficult to walk. She went to a clinic at work. [facility]. She was started on dabigatran and continues on this medication. Her pain and discomfort resolved within first week of starting the anticoagulation.     She started on accutane through a dermatologist about 4-5 months ago. She had to be on OCP to be on a accutane. She got the pain within a month or two of starting the OCP. She is off accutane and OCP. She had been on OCP in her 20's without any difficulty.     No family member with blood clots.     She has been referred to Hematology clinic for further thrombophilia work up.      PAST MEDICAL HISTORY   - DVT as above  - Depression - managed on fluoxetine  - No other chronic medical illness      CURRENT OUTPATIENT MEDICATIONS     Current Outpatient Prescriptions   Medication Sig     dabigatran ANTICOAGULANT (PRADAXA) 150 MG capsule Take 1 capsule (150 mg) by mouth twice daily. Store in original 's bottle or blister pack; use within 120 days of opening.     CLARAVIS 40 MG capsule      Cholecalciferol (VITAMIN D3 PO) Take 1 tablet by mouth daily     Omega-3 Fatty Acids (FISH OIL OMEGA-3 PO) Take 1 tablet by mouth daily      FLUoxetine (PROZAC) 40 MG capsule Take 1 capsule (40 mg) by mouth daily     Multiple Vitamin (DAILY VITAMINS PO)      No current facility-administered medications for this visit.         ALLERGIES      Allergies   Allergen  Reactions     No Known Drug Allergies         SOCIAL HISTORY     Social History     Social History Narrative    Dairy/d 3 servings/d.     Caffeine 0 servings/d    Exercise 0 x week    Sunscreen used - Yes    Seatbelts used - Yes    Working smoke/CO detectors in the home - Yes    Guns stored in the home - No    Self Breast Exams - Yes    Self Testicular Exam - NOT APPLICABLE    Eye Exam up to date - No    Dental Exam up to date - Yes    Pap Smear up to date - Yes    Mammogram up to date - YEs    PSA up to date - NOT APPLICABLE    Dexa Scan up to date - NOT APPLICABLE    Flex Sig / Colonoscopy up to date - NOT APPLICABLE    Immunizations up to date - Yes    Abuse: Current or Past(Physical, Sexual or Emotional)- No    Do you feel safe in your environment - Yes        Makenzie Miller, X-Ray Technician.    Td                 She is  and lives with her . She does not have kids.   She works in the [employer] at [employer].   She does not smoke cigarette. She is a never smoker. She has smoked occasional cigarettes in college.   She very rarely drinks alcohol.   She denies any recreational drug use.     FAMILY HISTORY   - Both grand parents had some form of cancer; one of them had prostate cancer  - Paternal grandmother  at age of 95 with CHF/dilated cardiomyopathy     REVIEW OF SYSTEMS   As above in the HPI, o/w complete 12-point ROS was negative.     PHYSICAL EXAM   B/P: 118/79, T: 98.3, P: 67, R: Data Unavailable  SpO2 Readings from Last 4 Encounters:   17 100%   10/02/17 100%   11 100%   11 100%     Wt Readings from Last 3 Encounters:   17 99.7 kg (219 lb 14.4 oz)   10/02/17 95 kg (209 lb 8 oz)   09/15/17 94.5 kg (208 lb 6.4 oz)     GEN: NAD  HEENT: PERRL, EOMI, no icterus, injection or pallor. Oropharynx is clear.  NECK: no cervical or supraclavicular lymphadenopathy  LUNGS: clear bilaterally  CV: regular, no murmurs, rubs, or gallops  ABDOMEN: soft, non-tender,  non-distended, normal bowel sounds, no hepatosplenomegaly by percussion or palpation  EXT: warm, well perfused, no edema  NEURO: alert  SKIN: no rashes     LABORATORY AND IMAGING STUDIES     Recent Labs   Lab Test  09/15/17   0849  08/24/16   0936  03/01/13   1044  10/02/12   1650   POTASSIUM   --    --    --   4.2   BUN   --    --    --   16   CR   --    --    --   0.64   GLC  87  85  88   --      No results for input(s): MAG, PHOS in the last 29365 hours.  Recent Labs   Lab Test  03/08/11   1544   HGB  12.8     No results for input(s): BILITOTAL, ALKPHOS, ALT, AST, ALBUMIN, LDH in the last 79181 hours.  TSH   Date Value Ref Range Status   09/15/2017 0.56 0.40 - 4.00 mU/L Final   08/24/2016 1.42 0.40 - 4.00 mU/L Final   06/02/2011 2.09 0.4 - 5.0 mU/L Final     No results for input(s): CEA in the last 11689 hours.  Results for orders placed or performed in visit on 12/04/17   US Lower Extremity Venous Duplex Left    Narrative    EXAMINATION: US LOWER EXTREMITY VENOUS DUPLEX LEFT, 12/4/2017 7:20 AM     COMPARISON: Prior imaging is not available for comparison.    HISTORY: 41 years old with reported history of left calf DVT.    TECHNIQUE:  Gray-scale evaluation with compression, spectral flow, and  color Doppler assessment of the deep venous system of the left leg  from groin to knee, and then at the ankle.    FINDINGS:  In the left lower extremity, the common femoral, superficial femoral,  popliteal and posterior tibial veins demonstrate normal  compressibility and blood flow. There is normal compressibility,  phasicity and augmentation in the right common femoral vein.      Impression    IMPRESSION:  1.  No evidence left lower extremity deep venous thrombosis on today's  exam. Correlation with outside study is recommended.    LESLIE GARSIA MD       Lab Results   Component Value Date    PATH  08/24/2016       Patient Name: GABI HENDRIX  MR#: [id number]  Specimen #: [id number]  Collected: 8/24/2016  Received:  8/24/2016  Reported: 8/25/2016 12:09  Ordering Phy(s): DARBY SALGADO    SPECIMEN/STAIN PROCESS:  Pap imaged thin layer prep screening (Surepath, FocalPoint with guided  screening)       Pap-Cyto x 1, Reflex HPV if NIL/ASCUS/LSIL x 1    SOURCE: Cervical, endocervical  ----------------------------------------------------------------   Pap imaged thin layer prep screening (Surepath, FocalPoint with guided  screening)  SPECIMEN ADEQUACY:  Satisfactory for evaluation.  -Transformation zone component absent.    CYTOLOGIC INTERPRETATION:    Negative for Intraepithelial Lesion or Malignancy    Electronically signed out by:  BIANCA Reynoso (ASCP)    Processed and screened at [facility]    CLINICAL HISTORY:  LMP: 7/24/2016  Previous normal pap  Date of Last Pap: 12/31/2013,    Papanicolaou Test Limitations:  Cervical cytology is a screening test  with limited sensitivity; regular screening is critical for cancer  prevention; Pap tests are primarily effective for the  diagnosis/prevention of squamous cell carcinoma, not adenocarcinomas or  other cancers.    TESTING LAB LOCATION:  [address]  [phone]    COLLECTION SITE:  Client:  [facility]  Location: [facility] ()         ASSESSMENT    1. Provoked lower extremity DVT after starting OCP in 41 year old female with obesity  2. Depression managed on fluoxetine  3. Recent treatment of acne with Accutane    DISCUSSION   I had a lengthy discussion with patient who is alone at this clinic visit. I tried to explain thrombophilia with risk factors, precipitating factors, risk, prognosis and management.     I explained risk factors and other etiologies including immobility, inflammation, infections/sepsis, thrombophilia with inherited disorders.     By a rule of thumb anti-coagulation is recommended for period 3 months after  the provoking factor is gone. If there is no known provoking factor or the risk is persistent (lifelong), then the recommended duration of anticoagulation is life long.     In her case, she started with oral contraceptives within a month prior to noticing pain in her leg. Oral contraceptives significantly increase the risk of DVT, especially after 39 years of age (Eur J Contracept Reprod Health Care. 2000;5(4):265). Obesity further increases the risk of DVT. Risk of DVT was 10 fold in female patients with obesity on OC as compared to those not on contraceptives. She should avoid oral contraceptives including hormone replacement therapy. I have also reviewed other risk factors including immobility and hospitalization. She should be extra careful if she is travelling long distances or even if she gets admitted to the hospital.     I do not see an extensive thrombophilia work up done, though this would change little for her care at this time. With advancing age, risk for clot would continue to steadily increase. If she does get recurrent DVT, she would need lifelong anticoagulation - irrespective of the thrombophilia work up.     Anticoagulants like coumadin do not dissolve clots. In fact it merely prevents further extension of the clot. Bodies own thrombolytic system does attempt to dissolve the clot. Since lower extremities have low flow system unlike the lungs, the clots often persist. Thrombolytics are available and can resolve clots but they carry a huge risk of bleeding. They are used in the setting of saddle thrombus or other hemodynamically significant thrombus. These agents are more commonly used in the setting of arterial thrombosis as in acute MI, acute CVA or gangrene as the benefits of revascularization outweigh the risks of bleeding. In her case, her follow up US of lower extremity does not show any evidence of a DVT which is quite encouraging.        PLAN   - I will work up for her thrombophilia.  - I  [facility]  - I would not schedule a follow up at this time  - I would be happy to see her at a short notice if new issues arise or if she has any recurrent DVT    Over 45 min of direct face to face time spent with patient with more than 50% time spent in counseling and coordinating care.        Federico Huston ,  Division of Hematology, Oncology & Transplantation  [facility].

## 2017-12-04 NOTE — MR AVS SNAPSHOT
After Visit Summary   12/4/2017    Sandy Floyd    MRN: [id number]           Patient Information     Date Of Birth          1975        Visit Information        Provider Department      12/4/2017 3:30 PM Federico Cotter MD [facility]        Today's Diagnoses     Acute deep vein thrombosis (DVT) of left lower extremity, unspecified vein (H)    -  1       Follow-ups after your visit        Who to contact     If you have questions or need follow up information about today's clinic visit or your schedule please contact [facility] directly at [phone].  Normal or non-critical lab and imaging results will be communicated to you by Ariagorahart, letter or phone within 4 business days after the clinic has received the results. If you do not hear from us within 7 days, please contact the clinic through Ariagorahart or phone. If you have a critical or abnormal lab result, we will notify you by phone as soon as possible.  Submit refill requests through FlexEl or call your pharmacy and they will forward the refill request to us. Please allow 3 business days for your refill to be completed.          Additional Information About Your Visit        MyChart Information     FlexEl gives you secure access to your electronic health record. If you see a primary care provider, you can also send messages to your care team and make appointments. If you have questions, please call your primary care clinic.  If you do not have a primary care provider, please call [phone] and they will assist you.      FlexEl is an electronic gateway that provides easy, online access to your medical records. With FlexEl, you can request a clinic appointment, read your test results, renew a prescription or communicate with your care team.     To access your existing account, please contact your [facility] or call [phone] for assistance.        Care  EveryWhere ID     This is your Care EveryWhere ID. This could be used by other organizations to access your [place] medical records  [id number]        Your Vitals Were     Pulse Temperature Pulse Oximetry BMI (Body Mass Index)          67 98.3  F (36.8  C) (Oral) 100% 40.62 kg/m2         Blood Pressure from Last 3 Encounters:   12/04/17 118/79   10/02/17 104/80   09/15/17 112/70    Weight from Last 3 Encounters:   12/04/17 99.7 kg (219 lb 14.4 oz)   10/02/17 95 kg (209 lb 8 oz)   09/15/17 94.5 kg (208 lb 6.4 oz)              We Performed the Following     Activated protein C resistance     Antithrombin III     Beta 2 Glycoprotein 1 Antibody IgG     Beta 2 Glycoprotein 1 Antibody IgM     Cardiolipin Hoda IgG and IgM     Factor 2 and 5 mutation analysis     Fibrinogen Antigen     Lupus Anticoagulant Panel     Protein C chromogenic     Protein S Antigen Free        Primary Care Provider Office Phone # Fax #    JAVID Mei [facility] [phone] [address] [address]        Equal Access to Services     [facility] campusEFREN : Hadii aad ku hadasho Soomaali, waaxda luqadaha, qaybta kaalmada adeegyada, irish thompson . So [facility] [phone].    ATENCIÓN: Si habla español, tiene a phillips disposición servicios gratuitos de asistencia lingüística. [facility] [phone].    We comply with applicable federal civil rights laws and [place] laws. We do not discriminate on the basis of race, color, national origin, age, disability, sex, sexual orientation, or gender identity.            Thank you!     Thank you for choosing [facility]  for your care. Our goal is always to provide you with excellent care. Hearing back from our patients is one way we can continue to improve our services. Please take a few minutes to complete the written survey that you may receive in the mail after your visit with us. Thank you!             Your Updated Medication List - Protect  others around you: Learn how to safely use, store and throw away your medicines at www.disposemymeds.org.          This list is accurate as of: 12/4/17  9:51 PM.  Always use your most recent med list.                   Brand Name Dispense Instructions for use Diagnosis    CLARAVIS 40 MG capsule   Generic drug:  ISOtretinoin           dabigatran ANTICOAGULANT 150 MG capsule    PRADAXA    180 capsule    Take 1 capsule (150 mg) by mouth twice daily. Store in original 's bottle or blister pack; use within 120 days of opening.    Acute deep vein thrombosis (DVT) of other specified vein of left lower extremity (H)       DAILY VITAMINS PO           FISH OIL OMEGA-3 PO      Take 1 tablet by mouth daily        FLUoxetine 40 MG capsule    PROzac    90 capsule    Take 1 capsule (40 mg) by mouth daily    Major depressive disorder, recurrent episode, moderate (H)       VITAMIN D3 PO      Take 1 tablet by mouth daily

## 2017-12-04 NOTE — TELEPHONE ENCOUNTER
Called patient left message on patient;s voice mail confirming that her appointment with Dr. Cotter today is at the [facility] location not [facility]. Mary Joyner RN

## 2017-12-05 LAB
AT III ACT/NOR PPP CHRO: 108 % (ref 85–135)
B2 GLYCOPROT1 IGG SERPL IA-ACNC: 2.3 U/ML
B2 GLYCOPROT1 IGM SERPL IA-ACNC: 1.1 U/ML
CARDIOLIPIN ANTIBODY IGG: <1.6 GPL-U/ML (ref 0–19.9)
CARDIOLIPIN ANTIBODY IGM: 2.3 MPL-U/ML (ref 0–19.9)

## 2017-12-06 LAB — LA PPP-IMP: NEGATIVE

## 2017-12-08 LAB
APCR PPP: 2.87 {RATIO}
FIBRINOGEN AG PPP IA-MCNC: 363 MG/DL (ref 149–353)
PROT C ACT/NOR PPP CHRO: 94 % (ref 70–170)
PROT S FREE AG ACT/NOR PPP IA: 130 % (ref 55–125)

## 2017-12-11 LAB — COPATH REPORT: NORMAL

## 2017-12-12 ENCOUNTER — MYC MEDICAL ADVICE (OUTPATIENT)
Dept: FAMILY MEDICINE | Facility: CLINIC | Age: 42
End: 2017-12-12

## 2017-12-29 ENCOUNTER — MYC MEDICAL ADVICE (OUTPATIENT)
Dept: FAMILY MEDICINE | Facility: CLINIC | Age: 42
End: 2017-12-29

## 2017-12-29 DIAGNOSIS — Z86.718 HISTORY OF DEEP VENOUS THROMBOSIS: ICD-10-CM

## 2017-12-29 DIAGNOSIS — M79.605 PAIN OF LEFT LOWER EXTREMITY: Primary | ICD-10-CM

## 2018-01-05 ENCOUNTER — MYC MEDICAL ADVICE (OUTPATIENT)
Dept: FAMILY MEDICINE | Facility: CLINIC | Age: 43
End: 2018-01-05

## 2018-02-15 DIAGNOSIS — F33.1 MAJOR DEPRESSIVE DISORDER, RECURRENT EPISODE, MODERATE (H): ICD-10-CM

## 2018-02-15 RX ORDER — FLUOXETINE 40 MG/1
CAPSULE ORAL
Qty: 30 CAPSULE | Refills: 2 | Status: SHIPPED | OUTPATIENT
Start: 2018-02-15 | End: 2018-07-02

## 2018-02-15 NOTE — TELEPHONE ENCOUNTER
Prozac:  Routing refill request to provider for review/approval because:  Appears to be a break in medication - should have been out around 11/2017    Amalia Agudelo, RN, BSN

## 2018-03-15 ENCOUNTER — RADIANT APPOINTMENT (OUTPATIENT)
Dept: MAMMOGRAPHY | Facility: CLINIC | Age: 43
End: 2018-03-15
Attending: NURSE PRACTITIONER
Payer: COMMERCIAL

## 2018-03-15 DIAGNOSIS — Z12.31 ENCOUNTER FOR SCREENING MAMMOGRAM FOR BREAST CANCER: ICD-10-CM

## 2018-03-15 DIAGNOSIS — Z00.01 ENCOUNTER FOR ROUTINE ADULT HEALTH EXAMINATION WITH ABNORMAL FINDINGS: ICD-10-CM

## 2018-03-15 PROCEDURE — 77063 BREAST TOMOSYNTHESIS BI: CPT | Performed by: STUDENT IN AN ORGANIZED HEALTH CARE EDUCATION/TRAINING PROGRAM

## 2018-03-15 PROCEDURE — 77067 SCR MAMMO BI INCL CAD: CPT | Performed by: STUDENT IN AN ORGANIZED HEALTH CARE EDUCATION/TRAINING PROGRAM

## 2018-07-02 ENCOUNTER — E-VISIT (OUTPATIENT)
Dept: FAMILY MEDICINE | Facility: CLINIC | Age: 43
End: 2018-07-02
Payer: COMMERCIAL

## 2018-07-02 DIAGNOSIS — F33.1 MAJOR DEPRESSIVE DISORDER, RECURRENT EPISODE, MODERATE (H): ICD-10-CM

## 2018-07-02 PROCEDURE — 98969 ZZC NONPHYSICIAN ONLINE ASSESSMENT AND MANAGEMENT: CPT | Performed by: NURSE PRACTITIONER

## 2018-07-02 RX ORDER — FLUOXETINE 40 MG/1
CAPSULE ORAL
Qty: 30 CAPSULE | Refills: 0 | Status: SHIPPED | OUTPATIENT
Start: 2018-07-02 | End: 2018-08-06

## 2018-07-02 ASSESSMENT — ANXIETY QUESTIONNAIRES
2. NOT BEING ABLE TO STOP OR CONTROL WORRYING: MORE THAN HALF THE DAYS
1. FEELING NERVOUS, ANXIOUS, OR ON EDGE: MORE THAN HALF THE DAYS
7. FEELING AFRAID AS IF SOMETHING AWFUL MIGHT HAPPEN: SEVERAL DAYS
7. FEELING AFRAID AS IF SOMETHING AWFUL MIGHT HAPPEN: SEVERAL DAYS
5. BEING SO RESTLESS THAT IT IS HARD TO SIT STILL: NOT AT ALL
GAD7 TOTAL SCORE: 10
3. WORRYING TOO MUCH ABOUT DIFFERENT THINGS: MORE THAN HALF THE DAYS
6. BECOMING EASILY ANNOYED OR IRRITABLE: SEVERAL DAYS
GAD7 TOTAL SCORE: 10
GAD7 TOTAL SCORE: 10
4. TROUBLE RELAXING: MORE THAN HALF THE DAYS

## 2018-07-02 ASSESSMENT — PATIENT HEALTH QUESTIONNAIRE - PHQ9
10. IF YOU CHECKED OFF ANY PROBLEMS, HOW DIFFICULT HAVE THESE PROBLEMS MADE IT FOR YOU TO DO YOUR WORK, TAKE CARE OF THINGS AT HOME, OR GET ALONG WITH OTHER PEOPLE: SOMEWHAT DIFFICULT
SUM OF ALL RESPONSES TO PHQ QUESTIONS 1-9: 7
SUM OF ALL RESPONSES TO PHQ QUESTIONS 1-9: 7

## 2018-07-02 NOTE — TELEPHONE ENCOUNTER
Fluoxetine    PHQ-9 SCORE 5/19/2017 8/31/2017 9/14/2017   Total Score - - -   Total Score MyChart - 7 (Mild depression) 7 (Mild depression)   Total Score 2 7 7     Routing refill request to provider for review/approval because:  Labs out of range:  PHQ9  LOV 10/2/2017    Kendra Ramos, RN, BSN

## 2018-07-03 RX ORDER — BUPROPION HYDROCHLORIDE 150 MG/1
150 TABLET ORAL EVERY MORNING
Qty: 30 TABLET | Refills: 1 | Status: SHIPPED | OUTPATIENT
Start: 2018-07-03 | End: 2019-05-14

## 2018-07-03 ASSESSMENT — PATIENT HEALTH QUESTIONNAIRE - PHQ9: SUM OF ALL RESPONSES TO PHQ QUESTIONS 1-9: 7

## 2018-07-03 ASSESSMENT — ANXIETY QUESTIONNAIRES: GAD7 TOTAL SCORE: 10

## 2018-08-06 DIAGNOSIS — F33.1 MAJOR DEPRESSIVE DISORDER, RECURRENT EPISODE, MODERATE (H): ICD-10-CM

## 2018-08-07 RX ORDER — FLUOXETINE 40 MG/1
CAPSULE ORAL
Qty: 90 CAPSULE | Refills: 0 | Status: SHIPPED | OUTPATIENT
Start: 2018-08-07 | End: 2018-09-21

## 2018-08-07 NOTE — TELEPHONE ENCOUNTER
"Requested Prescriptions   Pending Prescriptions Disp Refills     FLUoxetine (PROZAC) 40 MG capsule [Pharmacy Med Name: FLUoxetine HCl Oral Capsule 40 MG] 30 capsule 0     Sig: TAKE ONE CAPSULE BY MOUTH ONE TIME DAILY    SSRIs Protocol Failed    8/6/2018  7:31 PM       Failed - Recent (6 mo) or future (30 days) visit within the authorizing provider's specialty    Patient had office visit in the last 6 months or has a visit in the next 30 days with authorizing provider or within the authorizing provider's specialty.  See \"Patient Info\" tab in inbasket, or \"Choose Columns\" in Meds & Orders section of the refill encounter.    PHQ-9 SCORE 9/14/2017 7/2/2018 8/2/2018   Total Score - - -   Total Score MyChart 7 (Mild depression) 7 (Mild depression) 0   Total Score 7 7 0          Passed - PHQ-9 score less than 5 in past 6 months    Please review last PHQ-9 score.          Passed - Patient is age 18 or older       Passed - No active pregnancy on record       Passed - No positive pregnancy test in last 12 months        FLUoxetine (PROZAC) 40 MG capsule  Routing refill request to provider for review/approval because:  KL to review and advise. Patient completed EVisit 07/02/2018, completed 1 month questionnaires. Please determine when you would like the patient to follow up in clinic.     Zoila Hewitt, RN, BSN           "

## 2018-09-21 ENCOUNTER — MYC MEDICAL ADVICE (OUTPATIENT)
Dept: FAMILY MEDICINE | Facility: CLINIC | Age: 43
End: 2018-09-21

## 2018-09-21 DIAGNOSIS — F33.1 MAJOR DEPRESSIVE DISORDER, RECURRENT EPISODE, MODERATE (H): ICD-10-CM

## 2018-09-21 NOTE — TELEPHONE ENCOUNTER
LM for the patient to return call to the clinic to discuss the below. Will await to hear from patient. Zoila Hewitt RN, BSN     RN huddled with provider. To determine when insurance runs out, could complete an OV prior to loss of insurance and send additional refills. Also to consider if medication is on the [employer] Pay cash plan via generic form of her medication. Zoila Hewitt RN, BSN

## 2018-09-24 ENCOUNTER — MYC MEDICAL ADVICE (OUTPATIENT)
Dept: FAMILY MEDICINE | Facility: CLINIC | Age: 43
End: 2018-09-24

## 2018-09-24 DIAGNOSIS — F33.1 MAJOR DEPRESSIVE DISORDER, RECURRENT EPISODE, MODERATE (H): ICD-10-CM

## 2018-09-24 RX ORDER — FLUOXETINE 40 MG/1
40 CAPSULE ORAL DAILY
Qty: 90 CAPSULE | Refills: 3 | Status: SHIPPED | OUTPATIENT
Start: 2018-09-24 | End: 2018-09-25

## 2018-09-25 RX ORDER — FLUOXETINE 40 MG/1
40 CAPSULE ORAL DAILY
Qty: 90 CAPSULE | Refills: 3 | Status: SHIPPED | OUTPATIENT
Start: 2018-09-25 | End: 2020-10-16

## 2019-03-05 NOTE — PROGRESS NOTES
"  SUBJECTIVE:   Sandy Floyd is a 43 year old female who presents to clinic today for the following health issues:  Physical- Pt. Has medication supply, is here for PE.     History of Present Illness     Depression & Anxiety Follow-up:     Depression/Anxiety:  Depression & Anxiety    Status since last visit::  Improved    Other associated symptoms of depression and anxiety::  None    Significant life event::  YES    Current substance use::  None       Today's PHQ-9         PHQ-9 Total Score:     (P) 2   PHQ-9 Q9 Suicidal ideation:   (P) Not at all   Thoughts of suicide or self harm:      Self-harm Plan:        Self-harm Action:          Safety concerns for self or others:       ESTRELLITA-7 Total Score: (P) 6    Vascular Disease:     Symptoms::  None    Worsened or new symptoms since last visit::  No    Nitroglycerin use::  None    Daily ASA::  NO    Diet:  Regular (no restrictions)  Frequency of exercise:  None  Taking medications regularly:  Yes  Medication side effects:  None  Additional concerns today:  Yes    She stopped taking the blood thinner, because she was feeling okay. Something behind her knee feels \"weird\" sometimes it feels like throbbing, unit(s) not painful. She is not concerned about it.   Pt was prescribed Wellbutrin during her last visit, however never filled the prescription. She likes the current dose of Prozac 40MG.   Pancreas-stable. She thought that this visit was a PE.   She is not doing anything for BC at the moment. 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 One of her 3 dogs are sick.   She is splitting up with her , she is still living with him. They have been  since August. She has been trying hard to be delicate with her . When she has days off, they leave and do different activities. Pt is not in love with him any longer. Her current  thinks they should keep living the " "way they are right now.   She met with her current boyfriend on an online game, he is 45 and has a good job. He does not pressure her to get , however wants to see her \"every day\". Their families do not know that she is going through divorce with her .    Lab Results   Component Value Date    PAP NIL 08/24/2016    PAP NIL 12/31/2013    PAP NIL 06/02/2011        Mammogram. - due this month.     Gets 3 servings of calcium/day  No formal exercise.   Feels safe in her environment.     Problem list and histories reviewed & adjusted, as indicated.  Additional history: as documented    Patient Active Problem List   Diagnosis     Back pain     Obesity     Pancreatitis, acute     Cholelithiasis     Dyspareunia     Pain in joint, pelvic region and thigh     Muscle weakness (generalized)     Moderate recurrent major depression (H)     CARDIOVASCULAR SCREENING; LDL GOAL LESS THAN 160     Anxiety state     Plantar fasciitis     Acne, unspecified acne type     Acute deep vein thrombosis (DVT) of left lower extremity, unspecified vein (H)     Morbid obesity (H)     Past Surgical History:   Procedure Laterality Date     CHOLECYSTECTOMY, LAPOROSCOPIC  3/15/11    Cholecystectomy, Laparoscopic     COSMETIC SURGERY       HC REDUCTION OF LARGE BREAST  3/11/09     SURGICAL HISTORY OF -   2003    laser tx on condyloma       Social History     Tobacco Use     Smoking status: Never Smoker     Smokeless tobacco: Never Used     Tobacco comment: I don't smoke.   Substance Use Topics     Alcohol use: No     Family History   Problem Relation Age of Onset     Cancer Paternal Grandfather         unknown     Arthritis Maternal Grandmother      Cancer Father         prostate cancer     Prostate Cancer Father      Circulatory Maternal Grandfather         blood clots     Other Cancer Maternal Grandfather      Arthritis Paternal Grandmother      Cancer Brother         Testicular cancer     Diabetes Other      Hypertension Other      " Asthma No family hx of      C.A.D. No family hx of      Cerebrovascular Disease No family hx of      Breast Cancer No family hx of      Cancer - colorectal No family hx of      Alzheimer Disease No family hx of      Blood Disease No family hx of      Cardiovascular No family hx of      Heart Disease No family hx of      Eye Disorder No family hx of      Gastrointestinal Disease No family hx of      Genitourinary Problems No family hx of      Lipids No family hx of      Musculoskeletal Disorder No family hx of      Neurologic Disorder No family hx of      Respiratory No family hx of      Thyroid Disease No family hx of          Current Outpatient Medications   Medication Sig Dispense Refill     Ascorbic Acid (VITAMIN C) 500 MG CAPS        Biotin 90611 MCG TABS        Cholecalciferol (VITAMIN D3 PO) Take 1 tablet by mouth daily       cholecalciferol (VITAMIN D3) 5000 units TABS tablet        FLUoxetine (PROZAC) 40 MG capsule Take 1 capsule (40 mg) by mouth daily 90 capsule 3     Magnesium 400 MG CAPS        Multiple Vitamin (DAILY VITAMINS PO)        Omega-3 Fatty Acids (FISH OIL OMEGA-3 PO) Take 1 tablet by mouth daily        buPROPion (WELLBUTRIN XL) 150 MG 24 hr tablet Take 1 tablet (150 mg) by mouth every morning (Patient not taking: Reported on 3/14/2019) 30 tablet 1     CLARAVIS 40 MG capsule   0     Allergies   Allergen Reactions     No Known Drug Allergies      Recent Labs   Lab Test 09/15/17  0849 08/24/16  0936 03/01/13  1044 10/02/12  1650   LDL 93 79 100  --    HDL 48* 64 51  --    TRIG 59 32 32  --    CR  --   --   --  0.64   GFRESTIMATED  --   --   --  >90   GFRESTBLACK  --   --   --  >90   POTASSIUM  --   --   --  4.2   TSH 0.56 1.42  --   --       BP Readings from Last 3 Encounters:   03/14/19 104/62   12/04/17 118/79   10/02/17 104/80    Wt Readings from Last 3 Encounters:   03/14/19 101.6 kg (224 lb)   12/04/17 99.7 kg (219 lb 14.4 oz)   10/02/17 95 kg (209 lb 8 oz)         Labs reviewed in  "EPIC    ROS:  Constitutional, neuro, ENT, endocrine, pulmonary, cardiac, gastrointestinal, genitourinary, musculoskeletal, integument and psychiatric systems are negative, except as otherwise noted.    This document serves as a record of the services and decisions personally performed and made by Lauren Mathew CNP. It was created on his/her behalf by Ramírez Chávez, trained medical scribe. The creation of this document is based the provider's statements to the medical scribes.    Scribe Ramírez Chávez 2:27 PM, March 14, 2019    OBJECTIVE:                                                    /62   Pulse 74   Temp 98.3  F (36.8  C) (Temporal)   Resp 16   Ht 1.57 m (5' 1.81\")   Wt 101.6 kg (224 lb)   LMP 02/25/2019 (Exact Date)   SpO2 100%   BMI 41.22 kg/m    Body mass index is 41.22 kg/m .  GENERAL APPEARANCE: healthy, alert and no distress  EYES: Eyes grossly normal to inspection, PERRL and conjunctivae and sclerae normal  HENT: ear canals and TM's normal and nose and mouth without ulcers or lesions  NECK: no adenopathy, no asymmetry, masses, or scars and thyroid normal to palpation  RESP: lungs clear to auscultation - no rales, rhonchi or wheezes  BREAST: normal without masses, tenderness or nipple discharge and no palpable axillary masses or adenopathy  CV: regular rates and rhythm, normal S1 S2, no S3 or S4 and no murmur, click or rub  LYMPHATICS: no cervical adenopathy  ABDOMEN: soft, nontender, without hepatosplenomegaly or masses and bowel sounds normal   (female): deferred by pt.  MS: extremities normal- no gross deformities noted  SKIN: no suspicious lesions or rashes  NEURO: Normal strength and tone, mentation intact and speech normal  PSYCH: mentation appears normal and affect normal/bright    Diagnostic test results:  No results found for this or any previous visit (from the past 24 hour(s)).     ASSESSMENT/PLAN:                                                        ICD-10-CM  "   1. Routine medical exam Z00.00 **Hemoglobin FUTURE anytime     Lipid panel reflex to direct LDL Fasting     **Glucose FUTURE anytime     **TSH with free T4 reflex FUTURE anytime     CANCELED: HIV Screening     CANCELED: TSH with free T4 reflex     CANCELED: Glucose     CANCELED: Hemoglobin     CANCELED: Lipid panel reflex to direct LDL Fasting   2. Moderate recurrent major depression (H) F33.1    3. Morbid obesity (H) E66.01    4. Screening for HIV (human immunodeficiency virus) Z11.4 **HIV Antigen Antibody Combo FUTURE anytime     Physical exam completed-normal.     Depression-stable, will continue with the current plan. Pt reports ding well on her current dose of Prozac 40MG. Refills provided. Counseling for divorce/separation advised.     Comprehensive labs ordered, will contact with results.     Pt is agreebale to an HIV screening at this time.     Morbid obesity- strongly advised lifestyle modifications, increase exercise to help with weight loss as well as mood.     Mammogram- ordered      Contraception/Safe sex advised. Pt. Will look into contraception with vasectomy with her partner. Hx of DVT- not wanting copper IUD.     Weight mgmt: pt. declined referral. Discussed nutrition and exercise.      Follow up with Provider in 6 months for a mood re-check.     The information in this document, created by the medical scribe for me, accurately reflects the services I personally performed and the decisions made by me. I have reviewed and approved this document for accuracy prior to leaving the patient care area.  Lauren Mathew CNP  2:28 PM, March 14, 2019    JAVID [facility]  Answers for HPI/ROS submitted by the patient on 3/13/2019   Chronic problems general questions HPI Form  If you checked off any problems, how difficult have these problems made it for you to do your work, take care of things at home, or get along with other people?: Somewhat difficult  PHQ9 TOTAL SCORE: 2  ESTRELLITA 7  TOTAL SCORE: 6

## 2019-03-13 ASSESSMENT — PATIENT HEALTH QUESTIONNAIRE - PHQ9
10. IF YOU CHECKED OFF ANY PROBLEMS, HOW DIFFICULT HAVE THESE PROBLEMS MADE IT FOR YOU TO DO YOUR WORK, TAKE CARE OF THINGS AT HOME, OR GET ALONG WITH OTHER PEOPLE: SOMEWHAT DIFFICULT
SUM OF ALL RESPONSES TO PHQ QUESTIONS 1-9: 2
SUM OF ALL RESPONSES TO PHQ QUESTIONS 1-9: 2

## 2019-03-13 ASSESSMENT — ANXIETY QUESTIONNAIRES
2. NOT BEING ABLE TO STOP OR CONTROL WORRYING: SEVERAL DAYS
5. BEING SO RESTLESS THAT IT IS HARD TO SIT STILL: NOT AT ALL
GAD7 TOTAL SCORE: 6
GAD7 TOTAL SCORE: 6
6. BECOMING EASILY ANNOYED OR IRRITABLE: SEVERAL DAYS
4. TROUBLE RELAXING: SEVERAL DAYS
1. FEELING NERVOUS, ANXIOUS, OR ON EDGE: SEVERAL DAYS
3. WORRYING TOO MUCH ABOUT DIFFERENT THINGS: SEVERAL DAYS
GAD7 TOTAL SCORE: 6
7. FEELING AFRAID AS IF SOMETHING AWFUL MIGHT HAPPEN: SEVERAL DAYS
7. FEELING AFRAID AS IF SOMETHING AWFUL MIGHT HAPPEN: SEVERAL DAYS

## 2019-03-14 ENCOUNTER — OFFICE VISIT (OUTPATIENT)
Dept: FAMILY MEDICINE | Facility: CLINIC | Age: 44
End: 2019-03-14
Payer: COMMERCIAL

## 2019-03-14 VITALS
DIASTOLIC BLOOD PRESSURE: 62 MMHG | SYSTOLIC BLOOD PRESSURE: 104 MMHG | WEIGHT: 224 LBS | RESPIRATION RATE: 16 BRPM | HEIGHT: 62 IN | HEART RATE: 74 BPM | OXYGEN SATURATION: 100 % | BODY MASS INDEX: 41.22 KG/M2 | TEMPERATURE: 98.3 F

## 2019-03-14 DIAGNOSIS — Z12.31 ENCOUNTER FOR SCREENING MAMMOGRAM FOR BREAST CANCER: ICD-10-CM

## 2019-03-14 DIAGNOSIS — Z00.00 ROUTINE MEDICAL EXAM: Primary | ICD-10-CM

## 2019-03-14 DIAGNOSIS — F33.1 MODERATE RECURRENT MAJOR DEPRESSION (H): ICD-10-CM

## 2019-03-14 DIAGNOSIS — Z11.4 SCREENING FOR HIV (HUMAN IMMUNODEFICIENCY VIRUS): ICD-10-CM

## 2019-03-14 DIAGNOSIS — E66.01 MORBID OBESITY (H): ICD-10-CM

## 2019-03-14 PROCEDURE — 99396 PREV VISIT EST AGE 40-64: CPT | Performed by: NURSE PRACTITIONER

## 2019-03-14 RX ORDER — MULTIVIT-MIN/IRON/FOLIC ACID/K 18-600-40
CAPSULE ORAL
COMMUNITY

## 2019-03-14 RX ORDER — GLUCOSAMINE/D3/BOSWELLIA SERRA 1500MG-400
TABLET ORAL
COMMUNITY

## 2019-03-14 ASSESSMENT — PATIENT HEALTH QUESTIONNAIRE - PHQ9: SUM OF ALL RESPONSES TO PHQ QUESTIONS 1-9: 2

## 2019-03-14 ASSESSMENT — MIFFLIN-ST. JEOR: SCORE: 1631.31

## 2019-03-14 ASSESSMENT — ANXIETY QUESTIONNAIRES: GAD7 TOTAL SCORE: 6

## 2019-03-14 ASSESSMENT — PAIN SCALES - GENERAL: PAINLEVEL: NO PAIN (0)

## 2019-03-20 ENCOUNTER — ANCILLARY PROCEDURE (OUTPATIENT)
Dept: MAMMOGRAPHY | Facility: CLINIC | Age: 44
End: 2019-03-20
Attending: NURSE PRACTITIONER
Payer: COMMERCIAL

## 2019-03-20 DIAGNOSIS — Z00.00 ROUTINE MEDICAL EXAM: ICD-10-CM

## 2019-03-20 DIAGNOSIS — Z12.31 ENCOUNTER FOR SCREENING MAMMOGRAM FOR BREAST CANCER: ICD-10-CM

## 2019-03-20 DIAGNOSIS — Z11.4 SCREENING FOR HIV (HUMAN IMMUNODEFICIENCY VIRUS): ICD-10-CM

## 2019-03-20 LAB
CHOLEST SERPL-MCNC: 166 MG/DL
GLUCOSE SERPL-MCNC: 85 MG/DL (ref 70–99)
HDLC SERPL-MCNC: 57 MG/DL
HGB BLD-MCNC: 14.8 G/DL (ref 11.7–15.7)
LDLC SERPL CALC-MCNC: 102 MG/DL
NONHDLC SERPL-MCNC: 109 MG/DL
TRIGL SERPL-MCNC: 37 MG/DL
TSH SERPL DL<=0.005 MIU/L-ACNC: 1.36 MU/L (ref 0.4–4)

## 2019-03-20 PROCEDURE — 82947 ASSAY GLUCOSE BLOOD QUANT: CPT | Performed by: NURSE PRACTITIONER

## 2019-03-20 PROCEDURE — 80061 LIPID PANEL: CPT | Performed by: NURSE PRACTITIONER

## 2019-03-20 PROCEDURE — 36415 COLL VENOUS BLD VENIPUNCTURE: CPT | Performed by: NURSE PRACTITIONER

## 2019-03-20 PROCEDURE — 77063 BREAST TOMOSYNTHESIS BI: CPT | Performed by: RADIOLOGY

## 2019-03-20 PROCEDURE — 84443 ASSAY THYROID STIM HORMONE: CPT | Performed by: NURSE PRACTITIONER

## 2019-03-20 PROCEDURE — 77067 SCR MAMMO BI INCL CAD: CPT | Performed by: RADIOLOGY

## 2019-03-20 PROCEDURE — 87389 HIV-1 AG W/HIV-1&-2 AB AG IA: CPT | Performed by: NURSE PRACTITIONER

## 2019-03-20 PROCEDURE — 85018 HEMOGLOBIN: CPT | Performed by: NURSE PRACTITIONER

## 2019-03-21 LAB — HIV 1+2 AB+HIV1 P24 AG SERPL QL IA: NONREACTIVE

## 2019-05-14 ENCOUNTER — OFFICE VISIT (OUTPATIENT)
Dept: FAMILY MEDICINE | Facility: CLINIC | Age: 44
End: 2019-05-14
Payer: COMMERCIAL

## 2019-05-14 VITALS
BODY MASS INDEX: 42.69 KG/M2 | SYSTOLIC BLOOD PRESSURE: 122 MMHG | HEIGHT: 62 IN | RESPIRATION RATE: 22 BRPM | TEMPERATURE: 98.4 F | DIASTOLIC BLOOD PRESSURE: 88 MMHG | WEIGHT: 232 LBS | HEART RATE: 80 BPM

## 2019-05-14 DIAGNOSIS — Z30.013 ENCOUNTER FOR INITIAL PRESCRIPTION OF INJECTABLE CONTRACEPTIVE: Primary | ICD-10-CM

## 2019-05-14 PROCEDURE — 99213 OFFICE O/P EST LOW 20 MIN: CPT | Performed by: NURSE PRACTITIONER

## 2019-05-14 RX ORDER — MEDROXYPROGESTERONE ACETATE 150 MG/ML
150 INJECTION, SUSPENSION INTRAMUSCULAR
Status: COMPLETED | OUTPATIENT
Start: 2019-05-15 | End: 2019-11-22

## 2019-05-14 RX ORDER — MEDROXYPROGESTERONE ACETATE 150 MG/ML
150 INJECTION, SUSPENSION INTRAMUSCULAR
Status: DISCONTINUED | OUTPATIENT
Start: 2019-05-14 | End: 2019-05-14

## 2019-05-14 ASSESSMENT — ANXIETY QUESTIONNAIRES
1. FEELING NERVOUS, ANXIOUS, OR ON EDGE: SEVERAL DAYS
2. NOT BEING ABLE TO STOP OR CONTROL WORRYING: SEVERAL DAYS
5. BEING SO RESTLESS THAT IT IS HARD TO SIT STILL: NOT AT ALL
6. BECOMING EASILY ANNOYED OR IRRITABLE: SEVERAL DAYS
GAD7 TOTAL SCORE: 6
7. FEELING AFRAID AS IF SOMETHING AWFUL MIGHT HAPPEN: SEVERAL DAYS
3. WORRYING TOO MUCH ABOUT DIFFERENT THINGS: SEVERAL DAYS

## 2019-05-14 ASSESSMENT — PATIENT HEALTH QUESTIONNAIRE - PHQ9
5. POOR APPETITE OR OVEREATING: SEVERAL DAYS
SUM OF ALL RESPONSES TO PHQ QUESTIONS 1-9: 3

## 2019-05-14 ASSESSMENT — MIFFLIN-ST. JEOR: SCORE: 1670.6

## 2019-05-14 ASSESSMENT — PAIN SCALES - GENERAL: PAINLEVEL: NO PAIN (0)

## 2019-05-14 NOTE — PROGRESS NOTES
"  SUBJECTIVE:   Sandy Floyd is a 43 year old female who presents to clinic today for the following health issues:    HPI   Patient want to discuss about birth control option. Patient want low dosage estrogen.    Depression and Anxiety Follow-Up    Status since last visit: good     Other associated symptoms:None    Complicating factors:     Significant life event: Yes-  Separation upcoming     Current substance abuse: None    PHQ 8/2/2018 3/13/2019 5/14/2019   PHQ-9 Total Score 0 2 3   Q9: Thoughts of better off dead/self-harm past 2 weeks Not at all Not at all Not at all     ESTRELLITA-7 SCORE 8/2/2018 3/13/2019 5/14/2019   Total Score - - -   Total Score 3 (minimal anxiety) 6 (mild anxiety) -   Total Score 3 6 6     PHQ-9  English  PHQ-9   Any Language  ESTRELLITA-7  Suicide Assessment Five-step Evaluation and Treatment (SAFE-T)    SH/Mood  Pt is not moving out of her house, her ex partner I moving out in June. She feels \"awkward\" around him, because she spends some days at her new boyfriends. Her mood has been improved, however she is still nervous about living by herself.    BC  She would like to get on BC, but can't have Estrogen due to a risk of blood clots. She gets regular periods every month, first few days are heavy and events out. Pt is concerned about the pain with the IUD insertion. Pt would like to start on Depo-Provera. Last time she had intercourse was past weekend, 5/11-5/12.     Additional history: as documented    Reviewed and updated as needed this visit by clinical staff  Tobacco  Allergies  Meds  Med Hx  Surg Hx  Fam Hx  Soc Hx      Reviewed and updated as needed this visit by Provider       Patient Active Problem List   Diagnosis     Back pain     Obesity     Pancreatitis, acute     Cholelithiasis     Dyspareunia     Pain in joint, pelvic region and thigh     Muscle weakness (generalized)     Moderate recurrent major depression (H)     CARDIOVASCULAR SCREENING; LDL GOAL LESS THAN 160     " Anxiety state     Plantar fasciitis     Acne, unspecified acne type     Acute deep vein thrombosis (DVT) of left lower extremity, unspecified vein (H)     Morbid obesity (H)     Past Surgical History:   Procedure Laterality Date     CHOLECYSTECTOMY, LAPOROSCOPIC  3/15/11    Cholecystectomy, Laparoscopic     COSMETIC SURGERY       HC REDUCTION OF LARGE BREAST  3/11/09     SURGICAL HISTORY OF -   2003    laser tx on condyloma       Social History     Tobacco Use     Smoking status: Never Smoker     Smokeless tobacco: Never Used     Tobacco comment: I don't smoke.   Substance Use Topics     Alcohol use: Yes     Family History   Problem Relation Age of Onset     Cancer Paternal Grandfather         unknown     Arthritis Maternal Grandmother      Cancer Father         prostate cancer     Prostate Cancer Father      Circulatory Maternal Grandfather         blood clots     Other Cancer Maternal Grandfather      Arthritis Paternal Grandmother      Cancer Brother         Testicular cancer     Diabetes Other      Hypertension Other      Asthma No family hx of      C.A.D. No family hx of      Cerebrovascular Disease No family hx of      Breast Cancer No family hx of      Cancer - colorectal No family hx of      Alzheimer Disease No family hx of      Blood Disease No family hx of      Cardiovascular No family hx of      Heart Disease No family hx of      Eye Disorder No family hx of      Gastrointestinal Disease No family hx of      Genitourinary Problems No family hx of      Lipids No family hx of      Musculoskeletal Disorder No family hx of      Neurologic Disorder No family hx of      Respiratory No family hx of      Thyroid Disease No family hx of          Current Outpatient Medications   Medication Sig Dispense Refill     Ascorbic Acid (VITAMIN C) 500 MG CAPS        Biotin 61278 MCG TABS        Cholecalciferol (VITAMIN D3 PO) Take 1 tablet by mouth daily       FLUoxetine (PROZAC) 40 MG capsule Take 1 capsule (40 mg) by  "mouth daily 90 capsule 3     Magnesium 400 MG CAPS        Multiple Vitamin (DAILY VITAMINS PO)        Omega-3 Fatty Acids (FISH OIL OMEGA-3 PO) Take 1 tablet by mouth daily        Allergies   Allergen Reactions     No Known Drug Allergies      Recent Labs   Lab Test 03/20/19  1057 09/15/17  0849 08/24/16  0936  10/02/12  1650   * 93 79   < >  --    HDL 57 48* 64   < >  --    TRIG 37 59 32   < >  --    CR  --   --   --   --  0.64   GFRESTIMATED  --   --   --   --  >90   GFRESTBLACK  --   --   --   --  >90   POTASSIUM  --   --   --   --  4.2   TSH 1.36 0.56 1.42  --   --     < > = values in this interval not displayed.      BP Readings from Last 3 Encounters:   05/14/19 122/88   03/14/19 104/62   12/04/17 118/79    Wt Readings from Last 3 Encounters:   05/14/19 105.2 kg (232 lb)   03/14/19 101.6 kg (224 lb)   12/04/17 99.7 kg (219 lb 14.4 oz)        Labs reviewed in EPIC    ROS:  Constitutional, neuro, ENT, endocrine, pulmonary, cardiac, gastrointestinal, genitourinary, musculoskeletal, integument and psychiatric systems are negative, except as otherwise noted.    This document serves as a record of the services and decisions personally performed and made by Lauren Mathew CNP. It was created on his/her behalf by Ramírez Chávez, trained medical scribe. The creation of this document is based the provider's statements to the medical scribes.    Andrew Chávez 8:59 AM, May 14, 2019    OBJECTIVE:                                                    /88   Pulse 80   Temp 98.4  F (36.9  C) (Oral)   Resp 22   Ht 1.575 m (5' 2\")   Wt 105.2 kg (232 lb)   LMP 04/22/2019   BMI 42.43 kg/m    Body mass index is 42.43 kg/m .  GENERAL APPEARANCE: healthy, alert and no distress  NEURO: Normal strength and tone, mentation intact and speech normal  PSYCH: mentation appears normal and affect normal/bright    Diagnostic test results:  No results found for this or any previous visit (from the past 24 " hour(s)).     ASSESSMENT/PLAN:                                                        ICD-10-CM    1. Encounter for initial prescription of injectable contraceptive Z30.013 medroxyPROGESTERone (DEPO-PROVERA) injection 150 mg     HCG Qual, Urine ([id number])     Discussed improved mood and different BC options.    Pt has decided to start on Depo- provera 150MG, pt will return while on her period (next week). Discussed process of Depo, schedule. Recommended taking Calcium and Vitamin D supplements. Informed that we will run a urine pregnancy test at next visit.      Recommended an active lifestyle and healthy eating habits to help with weight loss and mood.       Follow up with Provider in 2-3 weeks for a nurse visit/ Depo injection.    The information in this document, created by the medical scribe for me, accurately reflects the services I personally performed and the decisions made by me. I have reviewed and approved this document for accuracy prior to leaving the patient care area.  Lauren Mathew CNP  9:56 AM, May 14, 2019    JAVID Ordonez CNP  [facility]

## 2019-05-14 NOTE — PATIENT INSTRUCTIONS
"Take Calcium with Vitamin D 500-600MG supplements after staring on Depo-Provera. 1 in the morning 1 in the afternoon.    Watchful monitoring recommended for blood clots.     Schedule a nurse visit (\"getting Depo on period\").   "

## 2019-05-15 ASSESSMENT — ANXIETY QUESTIONNAIRES: GAD7 TOTAL SCORE: 6

## 2019-05-16 ENCOUNTER — MYC MEDICAL ADVICE (OUTPATIENT)
Dept: FAMILY MEDICINE | Facility: CLINIC | Age: 44
End: 2019-05-16

## 2019-06-10 ENCOUNTER — TELEPHONE (OUTPATIENT)
Dept: FAMILY MEDICINE | Facility: CLINIC | Age: 44
End: 2019-06-10

## 2019-06-10 ENCOUNTER — MYC MEDICAL ADVICE (OUTPATIENT)
Dept: FAMILY MEDICINE | Facility: CLINIC | Age: 44
End: 2019-06-10

## 2019-06-11 ENCOUNTER — ALLIED HEALTH/NURSE VISIT (OUTPATIENT)
Dept: FAMILY MEDICINE | Facility: CLINIC | Age: 44
End: 2019-06-11
Payer: COMMERCIAL

## 2019-06-11 DIAGNOSIS — Z30.013 ENCOUNTER FOR INITIAL PRESCRIPTION OF INJECTABLE CONTRACEPTIVE: Primary | ICD-10-CM

## 2019-06-11 LAB — HCG UR QL: NEGATIVE

## 2019-06-11 PROCEDURE — 96372 THER/PROPH/DIAG INJ SC/IM: CPT

## 2019-06-11 PROCEDURE — 81025 URINE PREGNANCY TEST: CPT | Performed by: NURSE PRACTITIONER

## 2019-06-11 PROCEDURE — 99207 ZZC NO CHARGE NURSE ONLY: CPT

## 2019-06-11 RX ADMIN — MEDROXYPROGESTERONE ACETATE 150 MG: 150 INJECTION, SUSPENSION INTRAMUSCULAR at 09:06

## 2019-06-11 NOTE — PROGRESS NOTES
Prior to injection, verified patient identity using patient's name and date of birth.  Due to injection administration, patient instructed to remain in clinic for 15 minutes  afterwards, and to report any adverse reaction to me immediately.        LAST PAP/EXAM:   Lab Results   Component Value Date    PAP NIL 08/24/2016     URINE HCG:negative    NEXT INJECTION DUE: 8/27/19 - 9/10/19       Drug Amount Wasted:  None.  Vial/Syringe: Single dose vial  Expiration Date:  2/1/2020

## 2019-06-20 ENCOUNTER — MYC MEDICAL ADVICE (OUTPATIENT)
Dept: FAMILY MEDICINE | Facility: CLINIC | Age: 44
End: 2019-06-20

## 2019-06-21 NOTE — TELEPHONE ENCOUNTER
LM- need to speak with RN about Dizziness- please call. Should really not be due to Depo.     Should not be getting periods, reminded we talked about first injectionbleeding can happen.   Cost- check with insurance coverage- cannot answer this.     Discussed needing over 4 servings of calcium/day- [facility] has one and take that into consideration regarding supplement use.     Lauren Mathew

## 2019-08-16 ENCOUNTER — MYC MEDICAL ADVICE (OUTPATIENT)
Dept: FAMILY MEDICINE | Facility: CLINIC | Age: 44
End: 2019-08-16

## 2019-08-30 ENCOUNTER — ALLIED HEALTH/NURSE VISIT (OUTPATIENT)
Dept: FAMILY MEDICINE | Facility: CLINIC | Age: 44
End: 2019-08-30

## 2019-08-30 VITALS — SYSTOLIC BLOOD PRESSURE: 120 MMHG | DIASTOLIC BLOOD PRESSURE: 80 MMHG

## 2019-08-30 DIAGNOSIS — Z30.013 ENCOUNTER FOR INITIAL PRESCRIPTION OF INJECTABLE CONTRACEPTIVE: Primary | ICD-10-CM

## 2019-08-30 PROCEDURE — 96372 THER/PROPH/DIAG INJ SC/IM: CPT

## 2019-08-30 PROCEDURE — 99207 ZZC NO CHARGE NURSE ONLY: CPT

## 2019-08-30 RX ADMIN — MEDROXYPROGESTERONE ACETATE 150 MG: 150 INJECTION, SUSPENSION INTRAMUSCULAR at 11:09

## 2019-08-30 NOTE — NURSING NOTE
Chief Complaint   Patient presents with     Allied Health Visit     depo     Clinic Administered Medication Documentation    MEDICATION LIST:   Depo Provera Documentation    Prior to injection, verified patient identity using patient's name and date of birth. Medication was administered. Please see MAR and medication order for additional information. Patient instructed to remain in clinic for 15 minutes and report any adverse reaction to staff immediately .    BP: 120/80    LAST PAP/EXAM:   Lab Results   Component Value Date    PAP NIL 08/24/2016     URINE HCG:not indicated    NEXT INJECTION DUE: 11/15/19 - 11/29/19    Was entire vial of medication used? Yes  Vial/Syringe: Single dose vial  Expiration Date:  6/20    Kristan Ulloa CMA ([facility])

## 2019-09-19 ENCOUNTER — MYC MEDICAL ADVICE (OUTPATIENT)
Dept: FAMILY MEDICINE | Facility: CLINIC | Age: 44
End: 2019-09-19

## 2019-09-19 NOTE — TELEPHONE ENCOUNTER
Lm, discussed concerns or warning signs and home cares. Pt. may call back to speak with an RN if more questions. JAVID Ordonez CNP

## 2019-11-08 ENCOUNTER — HEALTH MAINTENANCE LETTER (OUTPATIENT)
Age: 44
End: 2019-11-08

## 2019-11-21 ENCOUNTER — MYC MEDICAL ADVICE (OUTPATIENT)
Dept: FAMILY MEDICINE | Facility: CLINIC | Age: 44
End: 2019-11-21

## 2019-11-22 ENCOUNTER — ALLIED HEALTH/NURSE VISIT (OUTPATIENT)
Dept: FAMILY MEDICINE | Facility: CLINIC | Age: 44
End: 2019-11-22

## 2019-11-22 ENCOUNTER — MYC MEDICAL ADVICE (OUTPATIENT)
Dept: FAMILY MEDICINE | Facility: CLINIC | Age: 44
End: 2019-11-22

## 2019-11-22 DIAGNOSIS — Z30.9 CONTRACEPTIVE MANAGEMENT: Primary | ICD-10-CM

## 2019-11-22 PROCEDURE — 99207 ZZC NO CHARGE NURSE ONLY: CPT

## 2019-11-22 PROCEDURE — 96372 THER/PROPH/DIAG INJ SC/IM: CPT

## 2019-11-22 RX ADMIN — MEDROXYPROGESTERONE ACETATE 150 MG: 150 INJECTION, SUSPENSION INTRAMUSCULAR at 10:15

## 2019-11-22 NOTE — PROGRESS NOTES
Clinic Administered Medication Documentation    MEDICATION LIST:   Depo Provera Documentation    Prior to injection, verified patient identity using patient's name and date of birth. Medication was administered. Please see MAR and medication order for additional information. Patient instructed to remain in clinic for 15 minutes.      LAST PAP/EXAM:   Lab Results   Component Value Date    PAP NIL 08/24/2016     URINE HCG:not indicated    NEXT INJECTION DUE: 2/7/20 - 2/21/20    Was entire vial of medication used? Yes  Vial/Syringe: Single dose vial  Expiration Date:  7/1/2020

## 2020-02-18 ENCOUNTER — ALLIED HEALTH/NURSE VISIT (OUTPATIENT)
Dept: FAMILY MEDICINE | Facility: CLINIC | Age: 45
End: 2020-02-18

## 2020-02-18 DIAGNOSIS — Z30.42 ENCOUNTER FOR SURVEILLANCE OF INJECTABLE CONTRACEPTIVE: Primary | ICD-10-CM

## 2020-02-18 PROCEDURE — 96372 THER/PROPH/DIAG INJ SC/IM: CPT

## 2020-02-18 PROCEDURE — 99207 ZZC NO CHARGE NURSE ONLY: CPT

## 2020-02-18 RX ORDER — MEDROXYPROGESTERONE ACETATE 150 MG/ML
150 INJECTION, SUSPENSION INTRAMUSCULAR ONCE
Status: COMPLETED | OUTPATIENT
Start: 2020-02-18 | End: 2020-02-18

## 2020-02-18 RX ADMIN — MEDROXYPROGESTERONE ACETATE 150 MG: 150 INJECTION, SUSPENSION INTRAMUSCULAR at 14:16

## 2020-02-18 NOTE — NURSING NOTE
Clinic Administered Medication Documentation    MEDICATION LIST:   Depo Provera Documentation    Prior to injection, verified patient identity using patient's name and date of birth. Medication was administered. Please see MAR and medication order for additional information. Patient instructed to remain in clinic for 15 minutes.        LAST PAP/EXAM:   Lab Results   Component Value Date    PAP NIL 08/24/2016     URINE HCG:not indicated    NEXT INJECTION DUE: 5/5/20 - 5/19/20    Was entire vial of medication used? Yes  Vial/Syringe: Single dose vial  Expiration Date:  10/1/2020

## 2020-05-08 ENCOUNTER — ALLIED HEALTH/NURSE VISIT (OUTPATIENT)
Dept: FAMILY MEDICINE | Facility: OTHER | Age: 45
End: 2020-05-08

## 2020-05-08 DIAGNOSIS — Z30.42 ENCOUNTER FOR SURVEILLANCE OF INJECTABLE CONTRACEPTIVE: Primary | ICD-10-CM

## 2020-05-08 PROCEDURE — 96372 THER/PROPH/DIAG INJ SC/IM: CPT

## 2020-05-08 RX ORDER — MEDROXYPROGESTERONE ACETATE 150 MG/ML
150 INJECTION, SUSPENSION INTRAMUSCULAR
Status: DISCONTINUED | OUTPATIENT
Start: 2020-05-08 | End: 2023-02-21

## 2020-05-08 RX ADMIN — MEDROXYPROGESTERONE ACETATE 150 MG: 150 INJECTION, SUSPENSION INTRAMUSCULAR at 13:05

## 2020-05-08 NOTE — NURSING NOTE
Clinic Administered Medication Documentation      Depo Provera Documentation    URINE HCG: not indicated    Depo-Provera Standing Order inclusion/exclusion criteria reviewed.   Patient meets: inclusion criteria       LAST PAP/EXAM:   Lab Results   Component Value Date    PAP NIL 08/24/2016       Prior to injection, verified patient identity using patient's name and date of birth. Medication was administered. Please see MAR and medication order for additional information.     Was entire vial of medication used? Yes  Vial/Syringe: Single dose vial  Expiration Date:  101/12021  LEFT DELTOID - SITE       Patient instructed to remain in clinic for 15 minutes.  NEXT INJECTION DUE: 7/24/20 - 8/7/20

## 2020-08-03 NOTE — PROGRESS NOTES
Patient consents to receive outdoor care: Yes    Upon arrival, patient instructed to proceed to designated location, place vehicle in park, turn off, and remove keys  and Patient receiving an immunization or injection. Instructed patient to notify healthcare personnel if they are having an adverse reaction.    If we are unable to safely and ergonomically able to provide care- is the patient able to safely able to get out of car and transfer to a chair? Yes    Patient would like to receive their AVS via MyChart.    Clinic Administered Medication Documentation      Depo Provera Documentation    URINE HCG: not indicated    Depo-Provera Standing Order inclusion/exclusion criteria reviewed.   Patient meets: inclusion criteria     BP: 129/82  LAST PAP/EXAM:   Lab Results   Component Value Date    PAP NIL 08/24/2016       Prior to injection, verified patient identity using patient's name and date of birth. Medication was administered. Please see MAR and medication order for additional information.     Was entire vial of medication used? Yes  Vial/Syringe: Single dose vial  Expiration Date:  01/2021    Site: Right Deltoid    Patient instructed to remain in clinic for 15 minutes and report any adverse reaction to staff immediately .  NEXT INJECTION DUE: 10/19/20 - 11/2/20    Jes Lockett MA

## 2020-08-04 ENCOUNTER — ALLIED HEALTH/NURSE VISIT (OUTPATIENT)
Dept: FAMILY MEDICINE | Facility: OTHER | Age: 45
End: 2020-08-04

## 2020-08-04 VITALS — SYSTOLIC BLOOD PRESSURE: 129 MMHG | DIASTOLIC BLOOD PRESSURE: 82 MMHG | HEART RATE: 93 BPM

## 2020-08-04 DIAGNOSIS — Z30.9 CONTRACEPTIVE MANAGEMENT: Primary | ICD-10-CM

## 2020-08-04 PROCEDURE — 96372 THER/PROPH/DIAG INJ SC/IM: CPT

## 2020-08-04 PROCEDURE — 99207 ZZC NO CHARGE NURSE ONLY: CPT

## 2020-09-08 ENCOUNTER — MYC MEDICAL ADVICE (OUTPATIENT)
Dept: FAMILY MEDICINE | Facility: CLINIC | Age: 45
End: 2020-09-08

## 2020-09-08 NOTE — TELEPHONE ENCOUNTER
Lauren, please advise patient's question regarding the depo.     Writer responded to [employer] message regarding the IUD. Provided information on both IUD and explained that it may be possible to have the IUD inserted at same visit.   Gracia Madrigal RN  September 8, 2020

## 2020-09-10 NOTE — TELEPHONE ENCOUNTER
Patient has not viewed KL message yet, will postpone until tomorrow to see if she had a chance to view it.

## 2020-09-11 NOTE — TELEPHONE ENCOUNTER
Spoke to pt.  Read her the [email or web address] message from MALACHI.  She will schedule the one appt with MALACHI to discuss IUD in more detail and then schedule the 2nd appt if she chooses to go forward.

## 2020-10-14 NOTE — PROGRESS NOTES
SUBJECTIVE:   CC: Sandy Floyd is an 44 year old woman who presents for preventive health visit.       Patient has been advised of split billing requirements and indicates understanding: Yes  Healthy Habits:     Getting at least 3 servings of Calcium per day:  Yes    Bi-annual eye exam:  NO    Dental care twice a year:  Yes    Sleep apnea or symptoms of sleep apnea:  None    Diet:  Regular (no restrictions)    Frequency of exercise:  None    Taking medications regularly:  Yes    Medication side effects:  None    PHQ-2 Total Score: 1    Additional concerns today:  No          Anxiety, stable off medications patient is monitoring.    Today's PHQ-2 Score:   PHQ-2 ( 1999 [employer]) 10/15/2020   Q1: Little interest or pleasure in doing things 1   Q2: Feeling down, depressed or hopeless 0   PHQ-2 Score 1   Q1: Little interest or pleasure in doing things Several days   Q2: Feeling down, depressed or hopeless Not at all   PHQ-2 Score 1       Abuse: Current or Past (Physical, Sexual or Emotional) - No  Do you feel safe in your environment? Yes        Social History     Tobacco Use     Smoking status: Never Smoker     Smokeless tobacco: Never Used     Tobacco comment: I don't smoke.   Substance Use Topics     Alcohol use: Not Currently     Comment: on holidays only      If you drink alcohol do you typically have >3 drinks per day or >7 drinks per week? No    Alcohol Use 10/16/2020   Prescreen: >3 drinks/day or >7 drinks/week? -   Prescreen: >3 drinks/day or >7 drinks/week? No       Reviewed orders with patient.  Reviewed health maintenance and updated orders accordingly - Yes      Mammogram Screening: Patient under age 50, mutual decision reflected in health maintenance.      Pertinent mammograms are reviewed under the imaging tab.  History of abnormal Pap smear:   NO - age 30-65 PAP every 5 years with negative HPV co-testing recommended  Last 3 Pap Results:   PAP (no units)   Date Value   08/24/2016 NIL   12/31/2013  "NIL   06/02/2011 NIL     Last 3 Pap and HPV Results:   PAP / HPV Latest Ref Rng & Units 8/24/2016 12/31/2013 6/2/2011   PAP - NIL NIL NIL   HPV 16 DNA NEG Negative - -   HPV 18 DNA NEG Negative - -   OTHER HR HPV NEG Negative - -     PAP / HPV Latest Ref Rng & Units 8/24/2016 12/31/2013 6/2/2011   PAP - NIL NIL NIL   HPV 16 DNA NEG Negative - -   HPV 18 DNA NEG Negative - -   OTHER HR HPV NEG Negative - -     Reviewed and updated as needed this visit by clinical staff  Tobacco  Allergies  Meds   Med Hx  Surg Hx  Fam Hx  Soc Hx        Reviewed and updated as needed this visit by Provider                    Review of Systems   Constitutional: Negative for chills and fever.   HENT: Positive for congestion. Negative for ear pain, hearing loss and sore throat.    Eyes: Negative for pain and visual disturbance.   Respiratory: Negative for cough and shortness of breath.    Cardiovascular: Negative for chest pain, palpitations and peripheral edema.   Gastrointestinal: Negative for abdominal pain, constipation, diarrhea, heartburn, hematochezia and nausea.   Breasts:  Negative for tenderness, breast mass and discharge.   Genitourinary: Negative for dysuria, frequency, genital sores, hematuria, pelvic pain, urgency, vaginal bleeding and vaginal discharge.   Musculoskeletal: Negative for arthralgias, joint swelling and myalgias.   Skin: Negative for rash.   Neurological: Negative for dizziness, weakness, headaches and paresthesias.   Psychiatric/Behavioral: Negative for mood changes. The patient is nervous/anxious.           OBJECTIVE:   /68   Pulse 94   Temp 97.6  F (36.4  C) (Temporal)   Resp 16   Ht 1.575 m (5' 2\")   Wt 103 kg (227 lb)   LMP  (LMP Unknown)   SpO2 99%   Breastfeeding No   BMI 41.52 kg/m    Physical Exam  GENERAL: healthy, alert and no distress  EYES: Eyes grossly normal to inspection, PERRL and conjunctivae and sclerae normal  HENT: ear canals and TM's normal, nose and mouth without " ulcers or lesions  NECK: no adenopathy, no asymmetry, masses, or scars and thyroid normal to palpation  RESP: lungs clear to auscultation - no rales, rhonchi or wheezes  BREAST: normal without masses, tenderness or nipple discharge and no palpable axillary masses or adenopathy  CV: regular rate and rhythm, normal S1 S2, no S3 or S4, no murmur, click or rub, no peripheral edema and peripheral pulses strong  ABDOMEN: soft, nontender, no hepatosplenomegaly, no masses and bowel sounds normal  MS: no gross musculoskeletal defects noted, no edema  SKIN: no suspicious lesions or rashes and small Nevi noted on the right outer labia.  NEURO: Normal strength and tone, mentation intact and speech normal  PSYCH: mentation appears normal, affect normal/bright    Diagnostic Test Results:  Labs reviewed in Epic  No results found for this or any previous visit (from the past 24 hour(s)).    ASSESSMENT/PLAN:       ICD-10-CM    1. Routine medical exam  Z00.00 TSH with free T4 reflex     Glucose     Hemoglobin     Lipid panel reflex to direct LDL Fasting     Hemoglobin A1c     Pap imaged thin layer screen with HPV - recommended age 30 - 65 years (select HPV order below)     HPV High Risk Types DNA Cervical   2. Encounter for surveillance of injectable contraceptive  Z30.42 medroxyPROGESTERone (DEPO-PROVERA) injection 150 mg   3. Class 3 severe obesity with body mass index (BMI) of 40.0 to 44.9 in adult, unspecified obesity type, unspecified whether serious comorbidity present (H)  E66.01     Z68.41    4. Anxiety state  F41.1    5. Encounter for screening mammogram for breast cancer  Z12.31        Patient has been advised of split billing requirements and indicates understanding: Yes  COUNSELING:  Pt. Monitoring anxiety.     Reviewed preventive health counseling, as reflected in patient instructions       Regular exercise       Healthy diet/nutrition       Vision screening       Immunizations    UTD            Contraception        "Mammogram ordered  Continue calcium.    Monitor anxiety.    Depo-Provera orders.  Labs today.  Patient declined STD screen.    Estimated body mass index is 41.52 kg/m  as calculated from the following:    Height as of this encounter: 1.575 m (5' 2\").    Weight as of this encounter: 103 kg (227 lb).    Weight management plan: Discussed healthy diet and exercise guidelines Weight management declined    She reports that she has never smoked. She has never used smokeless tobacco.      Counseling Resources:  ATP IV Guidelines  Pooled Cohorts Equation Calculator  Breast Cancer Risk Calculator  BRCA-Related Cancer Risk Assessment: FHS-7 Tool  FRAX Risk Assessment  ICSI Preventive Guidelines  Dietary Guidelines for Americans, 2010  [employer]'s MyPlate  ASA Prophylaxis  Lung CA Screening    JAVID Ordonez [facility] DAVID  Answers for HPI/ROS submitted by the patient on 10/15/2020   Annual Exam:  If you checked off any problems, how difficult have these problems made it for you to do your work, take care of things at home, or get along with other people?: Somewhat difficult  PHQ9 TOTAL SCORE: 4  ESTRELLITA 7 TOTAL SCORE: 5    "

## 2020-10-15 ASSESSMENT — ENCOUNTER SYMPTOMS
SHORTNESS OF BREATH: 0
DIARRHEA: 0
NERVOUS/ANXIOUS: 1
HEADACHES: 0
DIZZINESS: 0
ARTHRALGIAS: 0
CONSTIPATION: 0
CHILLS: 0
ABDOMINAL PAIN: 0
FREQUENCY: 0
BREAST MASS: 0
DYSURIA: 0
HEMATURIA: 0
FEVER: 0
COUGH: 0
HEARTBURN: 0
PALPITATIONS: 0
HEMATOCHEZIA: 0
WEAKNESS: 0
NAUSEA: 0
SORE THROAT: 0
JOINT SWELLING: 0
EYE PAIN: 0
PARESTHESIAS: 0
MYALGIAS: 0

## 2020-10-15 ASSESSMENT — PATIENT HEALTH QUESTIONNAIRE - PHQ9
SUM OF ALL RESPONSES TO PHQ QUESTIONS 1-9: 4
SUM OF ALL RESPONSES TO PHQ QUESTIONS 1-9: 4
10. IF YOU CHECKED OFF ANY PROBLEMS, HOW DIFFICULT HAVE THESE PROBLEMS MADE IT FOR YOU TO DO YOUR WORK, TAKE CARE OF THINGS AT HOME, OR GET ALONG WITH OTHER PEOPLE: SOMEWHAT DIFFICULT

## 2020-10-15 ASSESSMENT — ANXIETY QUESTIONNAIRES
2. NOT BEING ABLE TO STOP OR CONTROL WORRYING: SEVERAL DAYS
1. FEELING NERVOUS, ANXIOUS, OR ON EDGE: SEVERAL DAYS
5. BEING SO RESTLESS THAT IT IS HARD TO SIT STILL: NOT AT ALL
6. BECOMING EASILY ANNOYED OR IRRITABLE: SEVERAL DAYS
GAD7 TOTAL SCORE: 5
4. TROUBLE RELAXING: SEVERAL DAYS
7. FEELING AFRAID AS IF SOMETHING AWFUL MIGHT HAPPEN: NOT AT ALL
3. WORRYING TOO MUCH ABOUT DIFFERENT THINGS: SEVERAL DAYS
GAD7 TOTAL SCORE: 5
GAD7 TOTAL SCORE: 5
7. FEELING AFRAID AS IF SOMETHING AWFUL MIGHT HAPPEN: NOT AT ALL

## 2020-10-16 ENCOUNTER — OFFICE VISIT (OUTPATIENT)
Dept: FAMILY MEDICINE | Facility: CLINIC | Age: 45
End: 2020-10-16
Payer: COMMERCIAL

## 2020-10-16 VITALS
HEART RATE: 94 BPM | SYSTOLIC BLOOD PRESSURE: 112 MMHG | OXYGEN SATURATION: 99 % | HEIGHT: 62 IN | DIASTOLIC BLOOD PRESSURE: 68 MMHG | RESPIRATION RATE: 16 BRPM | BODY MASS INDEX: 41.77 KG/M2 | TEMPERATURE: 97.6 F | WEIGHT: 227 LBS

## 2020-10-16 DIAGNOSIS — F41.1 ANXIETY STATE: ICD-10-CM

## 2020-10-16 DIAGNOSIS — Z12.31 ENCOUNTER FOR SCREENING MAMMOGRAM FOR BREAST CANCER: ICD-10-CM

## 2020-10-16 DIAGNOSIS — Z30.42 ENCOUNTER FOR SURVEILLANCE OF INJECTABLE CONTRACEPTIVE: ICD-10-CM

## 2020-10-16 DIAGNOSIS — E66.813 CLASS 3 SEVERE OBESITY WITH BODY MASS INDEX (BMI) OF 40.0 TO 44.9 IN ADULT, UNSPECIFIED OBESITY TYPE, UNSPECIFIED WHETHER SERIOUS COMORBIDITY PRESENT (H): ICD-10-CM

## 2020-10-16 DIAGNOSIS — E66.01 CLASS 3 SEVERE OBESITY WITH BODY MASS INDEX (BMI) OF 40.0 TO 44.9 IN ADULT, UNSPECIFIED OBESITY TYPE, UNSPECIFIED WHETHER SERIOUS COMORBIDITY PRESENT (H): ICD-10-CM

## 2020-10-16 DIAGNOSIS — Z00.00 ROUTINE MEDICAL EXAM: Primary | ICD-10-CM

## 2020-10-16 LAB
CHOLEST SERPL-MCNC: 150 MG/DL
GLUCOSE SERPL-MCNC: 78 MG/DL (ref 70–99)
HBA1C MFR BLD: 4.9 % (ref 0–5.6)
HDLC SERPL-MCNC: 51 MG/DL
HGB BLD-MCNC: 15.4 G/DL (ref 11.7–15.7)
LDLC SERPL CALC-MCNC: 89 MG/DL
NONHDLC SERPL-MCNC: 99 MG/DL
TRIGL SERPL-MCNC: 50 MG/DL
TSH SERPL DL<=0.005 MIU/L-ACNC: 0.87 MU/L (ref 0.4–4)

## 2020-10-16 PROCEDURE — 99396 PREV VISIT EST AGE 40-64: CPT | Performed by: NURSE PRACTITIONER

## 2020-10-16 PROCEDURE — 84443 ASSAY THYROID STIM HORMONE: CPT | Performed by: NURSE PRACTITIONER

## 2020-10-16 PROCEDURE — 82947 ASSAY GLUCOSE BLOOD QUANT: CPT | Performed by: NURSE PRACTITIONER

## 2020-10-16 PROCEDURE — 36415 COLL VENOUS BLD VENIPUNCTURE: CPT | Performed by: NURSE PRACTITIONER

## 2020-10-16 PROCEDURE — 87624 HPV HI-RISK TYP POOLED RSLT: CPT | Performed by: NURSE PRACTITIONER

## 2020-10-16 PROCEDURE — 85018 HEMOGLOBIN: CPT | Performed by: NURSE PRACTITIONER

## 2020-10-16 PROCEDURE — 83036 HEMOGLOBIN GLYCOSYLATED A1C: CPT | Performed by: NURSE PRACTITIONER

## 2020-10-16 PROCEDURE — G0145 SCR C/V CYTO,THINLAYER,RESCR: HCPCS | Performed by: NURSE PRACTITIONER

## 2020-10-16 PROCEDURE — 80061 LIPID PANEL: CPT | Performed by: NURSE PRACTITIONER

## 2020-10-16 RX ORDER — MEDROXYPROGESTERONE ACETATE 150 MG/ML
150 INJECTION, SUSPENSION INTRAMUSCULAR
Status: ACTIVE | OUTPATIENT
Start: 2020-10-16 | End: 2021-10-11

## 2020-10-16 ASSESSMENT — ENCOUNTER SYMPTOMS
SORE THROAT: 0
WEAKNESS: 0
HEMATOCHEZIA: 0
COUGH: 0
CONSTIPATION: 0
JOINT SWELLING: 0
BREAST MASS: 0
ABDOMINAL PAIN: 0
FREQUENCY: 0
FEVER: 0
DIARRHEA: 0
NAUSEA: 0
PALPITATIONS: 0
PARESTHESIAS: 0
SHORTNESS OF BREATH: 0
HEADACHES: 0
HEARTBURN: 0
ARTHRALGIAS: 0
NERVOUS/ANXIOUS: 1
DIZZINESS: 0
HEMATURIA: 0
CHILLS: 0
EYE PAIN: 0
MYALGIAS: 0
DYSURIA: 0

## 2020-10-16 ASSESSMENT — MIFFLIN-ST. JEOR: SCORE: 1642.92

## 2020-10-16 ASSESSMENT — ANXIETY QUESTIONNAIRES: GAD7 TOTAL SCORE: 5

## 2020-10-16 ASSESSMENT — PATIENT HEALTH QUESTIONNAIRE - PHQ9: SUM OF ALL RESPONSES TO PHQ QUESTIONS 1-9: 4

## 2020-10-20 LAB
COPATH REPORT: NORMAL
PAP: NORMAL

## 2020-10-21 LAB
FINAL DIAGNOSIS: NORMAL
HPV HR 12 DNA CVX QL NAA+PROBE: NEGATIVE
HPV16 DNA SPEC QL NAA+PROBE: NEGATIVE
HPV18 DNA SPEC QL NAA+PROBE: NEGATIVE
SPECIMEN DESCRIPTION: NORMAL
SPECIMEN SOURCE CVX/VAG CYTO: NORMAL

## 2020-10-30 ENCOUNTER — ALLIED HEALTH/NURSE VISIT (OUTPATIENT)
Dept: FAMILY MEDICINE | Facility: CLINIC | Age: 45
End: 2020-10-30
Payer: COMMERCIAL

## 2020-10-30 DIAGNOSIS — Z30.9 CONTRACEPTIVE MANAGEMENT: Primary | ICD-10-CM

## 2020-10-30 PROCEDURE — 99207 PR NO CHARGE LOS: CPT

## 2020-10-30 PROCEDURE — 96372 THER/PROPH/DIAG INJ SC/IM: CPT

## 2020-10-30 RX ADMIN — MEDROXYPROGESTERONE ACETATE 150 MG: 150 INJECTION, SUSPENSION INTRAMUSCULAR at 11:21

## 2020-10-30 NOTE — PROGRESS NOTES
Clinic Administered Medication Documentation      Depo Provera Documentation    URINE HCG: not indicated    Depo-Provera Standing Order inclusion/exclusion criteria reviewed.   Patient meets: inclusion criteria     BP: Data Unavailable  LAST PAP/EXAM:   Lab Results   Component Value Date    PAP NIL 10/16/2020       Prior to injection, verified patient identity using patient's name and date of birth. Medication was administered. Please see MAR and medication order for additional information.     Was entire vial of medication used? Yes  Vial/Syringe: Single dose vial  Expiration Date:  6/1/2021    Patient instructed to remain in clinic for 15 minutes.  NEXT INJECTION DUE: 1/15/21 - 1/29/21

## 2020-12-29 ENCOUNTER — ANCILLARY PROCEDURE (OUTPATIENT)
Dept: MAMMOGRAPHY | Facility: CLINIC | Age: 45
End: 2020-12-29
Attending: NURSE PRACTITIONER
Payer: COMMERCIAL

## 2020-12-29 DIAGNOSIS — Z12.31 VISIT FOR SCREENING MAMMOGRAM: ICD-10-CM

## 2020-12-29 PROCEDURE — 77067 SCR MAMMO BI INCL CAD: CPT | Performed by: RADIOLOGY

## 2020-12-29 PROCEDURE — 77063 BREAST TOMOSYNTHESIS BI: CPT | Performed by: RADIOLOGY

## 2021-01-26 ENCOUNTER — ALLIED HEALTH/NURSE VISIT (OUTPATIENT)
Dept: FAMILY MEDICINE | Facility: CLINIC | Age: 46
End: 2021-01-26
Payer: COMMERCIAL

## 2021-01-26 VITALS — DIASTOLIC BLOOD PRESSURE: 84 MMHG | SYSTOLIC BLOOD PRESSURE: 124 MMHG

## 2021-01-26 DIAGNOSIS — Z30.42 ENCOUNTER FOR SURVEILLANCE OF INJECTABLE CONTRACEPTIVE: Primary | ICD-10-CM

## 2021-01-26 PROCEDURE — 99207 PR NO CHARGE NURSE ONLY: CPT

## 2021-01-26 PROCEDURE — 96372 THER/PROPH/DIAG INJ SC/IM: CPT

## 2021-01-26 RX ADMIN — MEDROXYPROGESTERONE ACETATE 150 MG: 150 INJECTION, SUSPENSION INTRAMUSCULAR at 13:00

## 2021-01-26 NOTE — PROGRESS NOTES
.Clinic Administered Medication Documentation      Depo Provera Documentation    URINE HCG: not indicated    Depo-Provera Standing Order inclusion/exclusion criteria reviewed.   Patient meets: inclusion criteria     BP: Data Unavailable  LAST PAP/EXAM:   Lab Results   Component Value Date    PAP NIL 10/16/2020       Prior to injection, verified patient identity using patient's name and date of birth. Medication was administered. Please see MAR and medication order for additional information.     Was entire vial of medication used? Yes  Vial/Syringe: Single dose vial  Expiration Date:  06/2022    Patient instructed to stay in clinic after the injection but patient declined.  NEXT INJECTION DUE: 4/13/21 - 4/27/21    Minh Ortiz MA on 1/26/2021 at 1:07 PM

## 2021-03-01 ENCOUNTER — MYC MEDICAL ADVICE (OUTPATIENT)
Dept: FAMILY MEDICINE | Facility: CLINIC | Age: 46
End: 2021-03-01

## 2021-04-12 ENCOUNTER — MYC MEDICAL ADVICE (OUTPATIENT)
Dept: FAMILY MEDICINE | Facility: CLINIC | Age: 46
End: 2021-04-12

## 2021-04-19 ENCOUNTER — MYC MEDICAL ADVICE (OUTPATIENT)
Dept: FAMILY MEDICINE | Facility: CLINIC | Age: 46
End: 2021-04-19

## 2021-04-20 ENCOUNTER — ALLIED HEALTH/NURSE VISIT (OUTPATIENT)
Dept: FAMILY MEDICINE | Facility: CLINIC | Age: 46
End: 2021-04-20
Payer: COMMERCIAL

## 2021-04-20 DIAGNOSIS — Z30.42 ENCOUNTER FOR DEPO-PROVERA CONTRACEPTION: Primary | ICD-10-CM

## 2021-04-20 PROCEDURE — 99207 PR NO CHARGE NURSE ONLY: CPT

## 2021-04-20 PROCEDURE — 96372 THER/PROPH/DIAG INJ SC/IM: CPT

## 2021-04-20 RX ADMIN — MEDROXYPROGESTERONE ACETATE 150 MG: 150 INJECTION, SUSPENSION INTRAMUSCULAR at 10:47

## 2021-04-20 NOTE — PROGRESS NOTES
Clinic Administered Medication Documentation      Depo Provera Documentation    URINE HCG: not indicated    Depo-Provera Standing Order inclusion/exclusion criteria reviewed.   Patient meets: inclusion criteria     BP: Data Unavailable  LAST PAP/EXAM:   Lab Results   Component Value Date    PAP NIL 10/16/2020       Prior to injection, verified patient identity using patient's name and date of birth. Medication was administered. Please see MAR and medication order for additional information.     Was entire vial of medication used? Yes  Vial/Syringe: Single dose vial  Expiration Date:  09/2022    Patient instructed to stay in clinic after the injection but patient declined.  NEXT INJECTION DUE: 7/6/21 - 7/20/21    Nikki Lopez, ZEUS     Left arm;

## 2021-04-27 ENCOUNTER — MYC MEDICAL ADVICE (OUTPATIENT)
Dept: FAMILY MEDICINE | Facility: CLINIC | Age: 46
End: 2021-04-27

## 2021-04-28 NOTE — TELEPHONE ENCOUNTER
Responded via [employer].  Jason Cooper, JOELN, RN, PHN  [facility] [place]  April 28, 2021

## 2021-07-13 ENCOUNTER — ALLIED HEALTH/NURSE VISIT (OUTPATIENT)
Dept: FAMILY MEDICINE | Facility: CLINIC | Age: 46
End: 2021-07-13
Payer: COMMERCIAL

## 2021-07-13 VITALS — DIASTOLIC BLOOD PRESSURE: 74 MMHG | SYSTOLIC BLOOD PRESSURE: 126 MMHG

## 2021-07-13 DIAGNOSIS — Z30.42 ENCOUNTER FOR DEPO-PROVERA CONTRACEPTION: Primary | ICD-10-CM

## 2021-07-13 PROCEDURE — 96372 THER/PROPH/DIAG INJ SC/IM: CPT | Performed by: NURSE PRACTITIONER

## 2021-07-13 PROCEDURE — 99207 PR NO CHARGE NURSE ONLY: CPT

## 2021-07-13 RX ADMIN — MEDROXYPROGESTERONE ACETATE 150 MG: 150 INJECTION, SUSPENSION INTRAMUSCULAR at 10:08

## 2021-07-13 NOTE — PROGRESS NOTES
Clinic Administered Medication Documentation    Administrations This Visit     medroxyPROGESTERone (DEPO-PROVERA) injection 150 mg     Admin Date  07/13/2021 Action  Given Dose  150 mg Route  Intramuscular Site  Right Deltoid Administered By  Jose G Red MA    Ordering Provider: Lauren Mathew APRN CNP    Patient Supplied?: No                  Depo Provera Documentation    URINE HCG: not indicated    Depo-Provera Standing Order inclusion/exclusion criteria reviewed.   Patient meets: inclusion criteria     BP: Data Unavailable  LAST PAP/EXAM:   Lab Results   Component Value Date    PAP NIL 10/16/2020       Prior to injection, verified patient identity using patient's name and date of birth. Medication was administered. Please see MAR and medication order for additional information.     Was entire vial of medication used? Yes  Vial/Syringe: Single dose vial  Expiration Date:  08/30/22    Patient instructed to remain in clinic for 15 minutes.    NEXT INJECTION DUE: 9/28/21 - 10/12/21    Jose G Red MA on 7/13/2021 at 10:12 AM

## 2021-09-01 ENCOUNTER — MYC MEDICAL ADVICE (OUTPATIENT)
Dept: FAMILY MEDICINE | Facility: CLINIC | Age: 46
End: 2021-09-01

## 2021-09-25 ENCOUNTER — HEALTH MAINTENANCE LETTER (OUTPATIENT)
Age: 46
End: 2021-09-25

## 2021-09-28 ASSESSMENT — ENCOUNTER SYMPTOMS
HEMATOCHEZIA: 0
JOINT SWELLING: 0
DYSURIA: 0
DIARRHEA: 0
WEAKNESS: 0
SORE THROAT: 0
HEMATURIA: 0
PALPITATIONS: 0
EYE PAIN: 0
NAUSEA: 0
SHORTNESS OF BREATH: 0
NERVOUS/ANXIOUS: 0
ARTHRALGIAS: 0
FEVER: 0
DIZZINESS: 0
HEARTBURN: 0
ABDOMINAL PAIN: 0
CHILLS: 0
HEADACHES: 0
CONSTIPATION: 0
FREQUENCY: 0
PARESTHESIAS: 0
BREAST MASS: 0
MYALGIAS: 0
COUGH: 0

## 2021-09-30 ASSESSMENT — ENCOUNTER SYMPTOMS
DIARRHEA: 0
CONSTIPATION: 0
FREQUENCY: 0
DYSURIA: 0
SHORTNESS OF BREATH: 0
HEMATURIA: 0
COUGH: 0
DIZZINESS: 0
PARESTHESIAS: 0
BREAST MASS: 0
CHILLS: 0
SORE THROAT: 0
NERVOUS/ANXIOUS: 0
HEADACHES: 0
WEAKNESS: 0
PALPITATIONS: 0
MYALGIAS: 0
ABDOMINAL PAIN: 0
JOINT SWELLING: 0
EYE PAIN: 0
NAUSEA: 0
HEARTBURN: 0
HEMATOCHEZIA: 0
ARTHRALGIAS: 0
FEVER: 0

## 2021-09-30 NOTE — PROGRESS NOTES
SUBJECTIVE:   CC: Sandy Floyd is an 45 year old woman who presents for preventive health visit.       Patient has been advised of split billing requirements and indicates understanding: Yes  Healthy Habits:     Getting at least 3 servings of Calcium per day:  NO    Bi-annual eye exam:  NO    Dental care twice a year:  Yes    Sleep apnea or symptoms of sleep apnea:  Daytime drowsiness    Diet:  Regular (no restrictions)    Frequency of exercise:  None    Taking medications regularly:  No    Medication side effects:  None    PHQ-2 Total Score: 0    Additional concerns today:  No      Due for Depo renew- no issues. Taking calcium and vitamin D daily.     Is dating long term boyfriend- living together.   Work- stressful. Occ. Wrist pain.     Anxiety- stable, occ. Sleep disruption. Does not want meds.         Today's PHQ-2 Score:   PHQ-2 ( 1999 [employer]) 9/28/2021   Q1: Little interest or pleasure in doing things 0   Q2: Feeling down, depressed or hopeless 0   PHQ-2 Score 0   Q1: Little interest or pleasure in doing things Not at all   Q2: Feeling down, depressed or hopeless Not at all   PHQ-2 Score 0       Abuse: Current or Past (Physical, Sexual or Emotional) - No  Do you feel safe in your environment? Yes    Have you ever done Advance Care Planning? (For example, a Health Directive, POLST, or a discussion with a medical provider or your loved ones about your wishes): No, advance care planning information given to patient to review.  Patient declined advance care planning discussion at this time.    Social History     Tobacco Use     Smoking status: Never Smoker     Smokeless tobacco: Never Used     Tobacco comment: I don't smoke.   Substance Use Topics     Alcohol use: Not Currently     Comment: on holidays only      If you drink alcohol do you typically have >3 drinks per day or >7 drinks per week? No    Alcohol Use 10/1/2021   Prescreen: >3 drinks/day or >7 drinks/week? -   Prescreen: >3 drinks/day or >7  drinks/week? No       Reviewed orders with patient.  Reviewed health maintenance and updated orders accordingly - Yes  Lab work is in process    Breast Cancer Screening:    Breast CA Risk Assessment (FHS-7) 9/28/2021   Do you have a family history of breast, colon, or ovarian cancer? No / Unknown         Mammogram Screening - Offered annual screening and updated Health Maintenance for mutual plan based on risk factor consideration    Pertinent mammograms are reviewed under the imaging tab.    History of abnormal Pap smear:   NO - age 30-65 PAP every 5 years with negative HPV co-testing recommended  Last 3 Pap Results:   PAP (no units)   Date Value   10/16/2020 NIL   08/24/2016 NIL   12/31/2013 NIL     Last 3 Pap and HPV Results:   PAP / HPV Latest Ref Rng & Units 10/16/2020 8/24/2016 12/31/2013   PAP (Historical) - NIL NIL NIL   HPV16 NEG:Negative Negative Negative -   HPV18 NEG:Negative Negative Negative -   HRHPV NEG:Negative Negative Negative -     PAP / HPV Latest Ref Rng & Units 10/16/2020 8/24/2016 12/31/2013   PAP (Historical) - NIL NIL NIL   HPV16 NEG:Negative Negative Negative -   HPV18 NEG:Negative Negative Negative -   HRHPV NEG:Negative Negative Negative -     Reviewed and updated as needed this visit by clinical staff  Tobacco  Allergies  Meds   Med Hx  Surg Hx  Fam Hx  Soc Hx        Reviewed and updated as needed this visit by Provider                    Review of Systems   Constitutional: Negative for chills and fever.   HENT: Negative for congestion, ear pain, hearing loss and sore throat.    Eyes: Negative for pain and visual disturbance.   Respiratory: Negative for cough and shortness of breath.    Cardiovascular: Negative for chest pain, palpitations and peripheral edema.   Gastrointestinal: Negative for abdominal pain, constipation, diarrhea, heartburn, hematochezia and nausea.   Breasts:  Negative for tenderness, breast mass and discharge.   Genitourinary: Negative for dysuria, frequency,  "genital sores, hematuria, pelvic pain, urgency, vaginal bleeding and vaginal discharge.   Musculoskeletal: Negative for arthralgias, joint swelling and myalgias.   Skin: Negative for rash.   Neurological: Negative for dizziness, weakness, headaches and paresthesias.   Psychiatric/Behavioral: Negative for mood changes. The patient is not nervous/anxious.           OBJECTIVE:   /68   Pulse 84   Temp 98.6  F (37  C) (Temporal)   Resp 10   Ht 1.575 m (5' 2\")   Wt 103.1 kg (227 lb 4.8 oz)   SpO2 100%   BMI 41.57 kg/m    Physical Exam  GENERAL: healthy, alert and no distress  EYES: Eyes grossly normal to inspection, PERRL and conjunctivae and sclerae normal  HENT: ear canals and TM's normal, nose and mouth without ulcers or lesions  NECK: no adenopathy, no asymmetry, masses, or scars and thyroid normal to palpation  RESP: lungs clear to auscultation - no rales, rhonchi or wheezes  BREAST: normal without masses, tenderness or nipple discharge and no palpable axillary masses or adenopathy  CV: regular rate and rhythm, normal S1 S2, no S3 or S4, no murmur, click or rub, no peripheral edema , varicose veins in LE- occ. Itchy.   ABDOMEN: soft, nontender, no hepatosplenomegaly, no masses and bowel sounds normal  MS: no gross musculoskeletal defects noted, no edema  SKIN: no suspicious lesions or rashes  NEURO: Normal strength and tone, mentation intact and speech normal  PSYCH: mentation appears normal, affect normal/bright    Diagnostic Test Results:  Labs reviewed in Epic    ASSESSMENT/PLAN:       ICD-10-CM    1. Routine general medical examination at a health care facility  Z00.00 Lipid panel reflex to direct LDL Fasting     Glucose     Hemoglobin   2. Class 3 severe obesity with body mass index (BMI) of 40.0 to 44.9 in adult, unspecified obesity type, unspecified whether serious comorbidity present (H)  E66.01     Z68.41    3. Need for hepatitis C screening test  Z11.59 Hepatitis C Screen Reflex to HCV RNA " "Quant and Genotype   4. Need for prophylactic vaccination and inoculation against influenza  Z23    5. Encounter for screening mammogram for breast cancer  Z12.31 *MA Screening Digital Bilateral   6. Advanced directives, counseling/discussion  Z71.89     declined info.    7. Encounter for Depo-Provera contraception  Z30.42 medroxyPROGESTERone (DEPO-PROVERA) injection 150 mg       Patient has been advised of split billing requirements and indicates understanding: Yes  COUNSELING:  Reviewed preventive health counseling, as reflected in patient instructions       Regular exercise       Healthy diet/nutrition       Immunizations    Vaccinated for: Influenza             Contraception       Consider Hep C screening for all patients one time for ages 18-79 years       Advance Care Planning       - declined info.   Mammogram.   No early colon cancer HX.     Continue depo- unchanged, with calcium/vitmain D supplements.   Compression stocking for varicose veins- declined, declined referral.       Estimated body mass index is 41.57 kg/m  as calculated from the following:    Height as of this encounter: 1.575 m (5' 2\").    Weight as of this encounter: 103.1 kg (227 lb 4.8 oz).    Weight management plan: Discussed healthy diet and exercise guidelines sleep eval. declined.     She reports that she has never smoked. She has never used smokeless tobacco.      Counseling Resources:  ATP IV Guidelines  Pooled Cohorts Equation Calculator  Breast Cancer Risk Calculator  BRCA-Related Cancer Risk Assessment: FHS-7 Tool  FRAX Risk Assessment  ICSI Preventive Guidelines  Dietary Guidelines for Americans, 2010  USDA's MyPlate  ASA Prophylaxis  Lung CA Screening    JAVID Ordonez CNP  [facility] DAVID  Answers for HPI/ROS submitted by the patient on 10/1/2021  If you checked off any problems, how difficult have these problems made it for you to do your work, take care of things at home, or get along with other people?: " Somewhat difficult  PHQ9 TOTAL SCORE: 3  ESTRELLITA 7 TOTAL SCORE: 1

## 2021-10-01 ENCOUNTER — OFFICE VISIT (OUTPATIENT)
Dept: FAMILY MEDICINE | Facility: CLINIC | Age: 46
End: 2021-10-01
Payer: COMMERCIAL

## 2021-10-01 VITALS
RESPIRATION RATE: 10 BRPM | DIASTOLIC BLOOD PRESSURE: 68 MMHG | HEART RATE: 84 BPM | TEMPERATURE: 98.6 F | HEIGHT: 62 IN | WEIGHT: 227.3 LBS | BODY MASS INDEX: 41.83 KG/M2 | SYSTOLIC BLOOD PRESSURE: 122 MMHG | OXYGEN SATURATION: 100 %

## 2021-10-01 DIAGNOSIS — Z11.59 NEED FOR HEPATITIS C SCREENING TEST: ICD-10-CM

## 2021-10-01 DIAGNOSIS — Z30.42 ENCOUNTER FOR DEPO-PROVERA CONTRACEPTION: ICD-10-CM

## 2021-10-01 DIAGNOSIS — R06.83 SNORING: ICD-10-CM

## 2021-10-01 DIAGNOSIS — Z12.31 ENCOUNTER FOR SCREENING MAMMOGRAM FOR BREAST CANCER: ICD-10-CM

## 2021-10-01 DIAGNOSIS — Z23 NEED FOR PROPHYLACTIC VACCINATION AND INOCULATION AGAINST INFLUENZA: ICD-10-CM

## 2021-10-01 DIAGNOSIS — E66.813 CLASS 3 SEVERE OBESITY WITH BODY MASS INDEX (BMI) OF 40.0 TO 44.9 IN ADULT, UNSPECIFIED OBESITY TYPE, UNSPECIFIED WHETHER SERIOUS COMORBIDITY PRESENT (H): ICD-10-CM

## 2021-10-01 DIAGNOSIS — E66.01 CLASS 3 SEVERE OBESITY WITH BODY MASS INDEX (BMI) OF 40.0 TO 44.9 IN ADULT, UNSPECIFIED OBESITY TYPE, UNSPECIFIED WHETHER SERIOUS COMORBIDITY PRESENT (H): ICD-10-CM

## 2021-10-01 DIAGNOSIS — Z71.89 ADVANCED DIRECTIVES, COUNSELING/DISCUSSION: ICD-10-CM

## 2021-10-01 DIAGNOSIS — Z00.00 ROUTINE GENERAL MEDICAL EXAMINATION AT A HEALTH CARE FACILITY: Primary | ICD-10-CM

## 2021-10-01 LAB
CHOLEST SERPL-MCNC: 150 MG/DL
FASTING STATUS PATIENT QL REPORTED: YES
FASTING STATUS PATIENT QL REPORTED: YES
GLUCOSE BLD-MCNC: 93 MG/DL (ref 70–99)
HCV AB SERPL QL IA: NONREACTIVE
HDLC SERPL-MCNC: 57 MG/DL
HGB BLD-MCNC: 14.8 G/DL (ref 11.7–15.7)
LDLC SERPL CALC-MCNC: 84 MG/DL
NONHDLC SERPL-MCNC: 93 MG/DL
TRIGL SERPL-MCNC: 47 MG/DL

## 2021-10-01 PROCEDURE — 85018 HEMOGLOBIN: CPT | Performed by: NURSE PRACTITIONER

## 2021-10-01 PROCEDURE — 36415 COLL VENOUS BLD VENIPUNCTURE: CPT | Performed by: NURSE PRACTITIONER

## 2021-10-01 PROCEDURE — 90686 IIV4 VACC NO PRSV 0.5 ML IM: CPT | Performed by: NURSE PRACTITIONER

## 2021-10-01 PROCEDURE — 82947 ASSAY GLUCOSE BLOOD QUANT: CPT | Performed by: NURSE PRACTITIONER

## 2021-10-01 PROCEDURE — 99396 PREV VISIT EST AGE 40-64: CPT | Mod: 25 | Performed by: NURSE PRACTITIONER

## 2021-10-01 PROCEDURE — 96372 THER/PROPH/DIAG INJ SC/IM: CPT | Performed by: NURSE PRACTITIONER

## 2021-10-01 PROCEDURE — 86803 HEPATITIS C AB TEST: CPT | Performed by: NURSE PRACTITIONER

## 2021-10-01 PROCEDURE — 90471 IMMUNIZATION ADMIN: CPT | Performed by: NURSE PRACTITIONER

## 2021-10-01 PROCEDURE — 80061 LIPID PANEL: CPT | Performed by: NURSE PRACTITIONER

## 2021-10-01 RX ORDER — MEDROXYPROGESTERONE ACETATE 150 MG/ML
150 INJECTION, SUSPENSION INTRAMUSCULAR
Status: COMPLETED | OUTPATIENT
Start: 2021-10-01 | End: 2022-09-06

## 2021-10-01 RX ADMIN — MEDROXYPROGESTERONE ACETATE 150 MG: 150 INJECTION, SUSPENSION INTRAMUSCULAR at 09:09

## 2021-10-01 ASSESSMENT — ANXIETY QUESTIONNAIRES
3. WORRYING TOO MUCH ABOUT DIFFERENT THINGS: NOT AT ALL
1. FEELING NERVOUS, ANXIOUS, OR ON EDGE: SEVERAL DAYS
GAD7 TOTAL SCORE: 1
8. IF YOU CHECKED OFF ANY PROBLEMS, HOW DIFFICULT HAVE THESE MADE IT FOR YOU TO DO YOUR WORK, TAKE CARE OF THINGS AT HOME, OR GET ALONG WITH OTHER PEOPLE?: SOMEWHAT DIFFICULT
7. FEELING AFRAID AS IF SOMETHING AWFUL MIGHT HAPPEN: NOT AT ALL
6. BECOMING EASILY ANNOYED OR IRRITABLE: NOT AT ALL
2. NOT BEING ABLE TO STOP OR CONTROL WORRYING: NOT AT ALL
GAD7 TOTAL SCORE: 1
5. BEING SO RESTLESS THAT IT IS HARD TO SIT STILL: NOT AT ALL
7. FEELING AFRAID AS IF SOMETHING AWFUL MIGHT HAPPEN: NOT AT ALL
GAD7 TOTAL SCORE: 1
4. TROUBLE RELAXING: NOT AT ALL

## 2021-10-01 ASSESSMENT — PATIENT HEALTH QUESTIONNAIRE - PHQ9
SUM OF ALL RESPONSES TO PHQ QUESTIONS 1-9: 3
SUM OF ALL RESPONSES TO PHQ QUESTIONS 1-9: 3
10. IF YOU CHECKED OFF ANY PROBLEMS, HOW DIFFICULT HAVE THESE PROBLEMS MADE IT FOR YOU TO DO YOUR WORK, TAKE CARE OF THINGS AT HOME, OR GET ALONG WITH OTHER PEOPLE: SOMEWHAT DIFFICULT

## 2021-10-01 ASSESSMENT — PAIN SCALES - GENERAL: PAINLEVEL: NO PAIN (0)

## 2021-10-01 ASSESSMENT — MIFFLIN-ST. JEOR: SCORE: 1639.28

## 2021-10-01 NOTE — NURSING NOTE
BP: 122/68    LAST PAP/EXAM:   Lab Results   Component Value Date    PAP NIL 10/16/2020     URINE HCG:not indicated    The following medication was given:     MEDICATION: Depo Provera 150mg  ROUTE: IM  SITE: Deltoid - Left  : [place]   LOT #: [id number]  EXP:04/2023  NEXT INJECTION DUE: 12/17/21 - 12/31/21

## 2021-10-01 NOTE — RESULT ENCOUNTER NOTE
Daya,  Your labs that have returned are normal. More labs are still processing. Lauren Mathew, DNP

## 2021-10-02 ASSESSMENT — PATIENT HEALTH QUESTIONNAIRE - PHQ9: SUM OF ALL RESPONSES TO PHQ QUESTIONS 1-9: 3

## 2021-10-02 ASSESSMENT — ANXIETY QUESTIONNAIRES: GAD7 TOTAL SCORE: 1

## 2021-10-04 NOTE — RESULT ENCOUNTER NOTE
Daya,  I have reviewed your labs, and they are all normal. If you have questions, please notify me through MyChart or a telephone call.   Lauren Mathew, DNP

## 2021-10-11 NOTE — TELEPHONE ENCOUNTER
Discussed medications and DVT. Lauren Mathew    [facility]  Office: [address], DO, Facundo Mary, DO, Hood Kamara, DO, Rynemayela Perez, DO, Kam Dukes MD, Kennedi Brooke MD, Lila Velasco MD, Dandy Suarez MD, Jayden Lizarraga MD, Karl Watkins MD, Kacie Lofton MD, Barbara Roldan, DO, Simeon Nuñez DO, Shell Tomlin MD,  Navdeep Galarza DO, Vinod Wang MD, James Alvarez MD, Ivy Cordon MD, Ashley Villegas MD, Claudeen Hook, MD, Andres Davidson MD, Julio Pulliam, Pasquale Garcia, CNP, Pat Diallo, CNP, Marylene Bucy, CNS, Anabela Matthews, CNP, Tai Brooks, CNP, Nancy Wong, CNP, José Miguel Powell, CNP, Sari Vega, CNP, Lila Louie, PA-C, April Christina, [facility], Yonathan Dobbins, CNP, Jordon Duron, CNP, Shoshana Cortes, CNP, Kuldip Cohen, CNP, Leslie García, CNP, Fely Holden, CNP, Raheel Ham, [address] [place] [address]    Progress Note    10/11/2021    7:37 AM    Name:   Khoi Thakur  MRN:     [id number]     Acct:      [de-identified]   Room:   [address] [place] [address] Day:  2  Admit Date:  10/9/2021  9:19 AM    PCP:   Fatoumata Ambrocio MD  Code Status:  DNR-CCA    Subjective:     C/C:   Chief Complaint   Patient presents with    Respiratory Distress      Interval History Status: improved. Pt was seen and examined this morning   No acute evnets overnight   Currently on nasal cannula  States that he is feeling better at this time   No new complaints     Brief History:     Adrian Camp a 45 y. o.yo male who presents with shortness of breath.  Patient report that the symptoms has been progressively worsening for the past 2 days.  On Tuesday, he states that he was exposed to Drano, not ingested but inhalation. Demetrio Monte states that since then his symptoms has worsened. Demetrio Monte denies being tested for Covid however he states that he is COVID vaccinated. Beau Westbrook any chest pain.  Patient does report that he has a history of bronchitis.   Patient denies any abdominal pain, no nausea or vomiting, no urinary dysuria    Review of Systems:     12 point ROS performed and negative for anything other than what was stated in subjective     Medications: Allergies:  No Known Allergies    Current Meds:   Scheduled Meds:    carvedilol  25 mg Oral BID WC    amLODIPine  10 mg Oral Daily    hydrALAZINE  50 mg Oral 3 times per day    influenza virus vaccine  0.5 mL IntraMUSCular Prior to discharge    cloNIDine  1 patch TransDERmal Weekly    pantoprazole  40 mg Oral QAM AC    azithromycin  500 mg IntraVENous Q24H    cefTRIAXone (ROCEPHIN) IV  1,000 mg IntraVENous Q24H     Continuous Infusions:   PRN Meds: benzocaine-menthol, acetaminophen, labetalol, albuterol sulfate HFA, ondansetron    Data:     Past Medical History:   has a past medical history of DEVYN (acute kidney injury) ([facility] [address] [facility] Bronchitis, Bronchitis, Hypertension, Moderate persistent asthma, and Obesity. Social History:   reports that he has never smoked. He has never used smokeless tobacco. He reports that he does not drink alcohol and does not use drugs. Family History:   Family History   Problem Relation Age of Onset    High Blood Pressure Mother     Diabetes Maternal Grandmother        Vitals:  BP (!) 155/100   Pulse 103   Temp 99 °F (37.2 °C) (Oral)   Resp 23   Ht 6' 5\" (1.956 m)   Wt (!) 318 lb (144.2 kg)   SpO2 98%   BMI 37.71 kg/m²   Temp (24hrs), Av.9 °F (37.2 °C), Min:98.4 °F (36.9 °C), Max:99.8 °F (37.7 °C)    Recent Labs     10/09/21  2026   POCGLU 162*       I/O (24Hr):     Intake/Output Summary (Last 24 hours) at 10/11/2021 0737  Last data filed at 10/11/2021 0600  Gross per 24 hour   Intake 6205.58 ml   Output 4375 ml   Net 1830.58 ml       Labs:  Hematology:  Recent Labs     10/09/21  0937 10/09/21  2236 10/10/21  0355   WBC 6.2  --  9.1   RBC 4.62  --  4.22   HGB 11.8*  --  10.9*   HCT 39.3*  --  36.9*   MCV 85.1  --  87.4   MCH 25.5  --  25.8   MCHC 30.0  --  29.5   RDW 13.6  -- 14.0     --  246   MPV 12.2  --  12.0   CRP  --  7.7*  --    INR 0.9  --   --    DDIMER 0.80  --   --      Chemistry:  Recent Labs     10/09/21  0937 10/09/21  1044 10/09/21  1617 10/09/21  2236 10/10/21  0355     --   --   --  134*   K 4.0  --   --   --  4.1     --   --   --  103   CO2 21  --   --   --  18*   GLUCOSE 94  --   --   --  101*   BUN 40*  --   --   --  29*   CREATININE 2.44*  --   --   --  2.08*   MG 2.3  --   --   --   --    ANIONGAP 14  --   --   --  13   LABGLOM 29*  --   --   --  35*   GFRAA 35*  --   --   --  43*   CALCIUM 9.0  --   --   --  8.2*   PROBNP 522*  --   --   --   --    TROPHS 48*   < > 50* 54* 58*   LACTACIDWB  --   --   --   --  1.8    < > = values in this interval not displayed. Recent Labs     10/09/21  0937 10/09/21  2026 10/09/21  2236 10/10/21  0355   PROT 7.9  --   --  7.1   LABALBU 3.8  --   --  3.3*   AST 36  --   --  26   ALT 29  --   --  24   LDH  --   --  456*  --    ALKPHOS 85  --   --  70   BILITOT 0.26*  --   --  0.21*   POCGLU  --  162*  --   --      ABG:  Lab Results   Component Value Date    POCPH 7.356 10/09/2021    POCPCO2 40.3 10/09/2021    POCPO2 68.7 10/09/2021    POCHCO3 22.6 10/09/2021    NBEA 3 10/09/2021    PBEA NOT REPORTED 10/09/2021    PAE5HOI NOT REPORTED 10/09/2021    GYFD2YLX 93 10/09/2021    FIO2 70.0 10/09/2021     Lab Results   Component Value Date/Time    SPECIAL L HAND, 0.5ML 10/09/2021 10:30 PM     Lab Results   Component Value Date/Time    CULTURE NO GROWTH 2 DAYS 10/09/2021 10:30 PM       Radiology:  XR CHEST PORTABLE    Result Date: 10/9/2021  1. Extensive airspace disease again demonstrated, right greater than left, without significant interval change. Differential considerations would include diffuse infection or edema. 2. Cardiomegaly     XR CHEST PORTABLE    Result Date: 10/9/2021  1. Bilateral airspace disease concerning for pneumonia. Recommend chest radiograph in 8 weeks to confirm resolution.      CT CHEST PULMONARY EMBOLISM W CONTRAST    Result Date: 10/9/2021  1. Motion artifact partially obscures some pulmonary artery subsegmental branches near the lung bases. With this limitation, no findings of pulmonary embolism are identified 2. Cardiovascular findings of pulmonary hypertension. 3. Slight reflux of the contrast bolus could be due to right heart dysfunction but could also be seen as normal variation. 4. Mixed consolidative and groundglass opacities throughout the right lung with minimal patchy groundglass opacities in the left lung base most suggestive of typical pneumonia. Alveolar edema could appear similar, but no significant findings of interstitial edema are present. Physical Examination:        General appearance:  alert, cooperative and no distress  Mental Status:  oriented to person, place and time and normal affect  Lungs:  clear to auscultation bilaterally, normal effort  Heart:  regular rate and rhythm, no murmur  Abdomen:  soft, nontender, nondistended, normal bowel sounds  Extremities:  no edema, redness, tenderness in the calves  Skin:  no gross lesions, rashes, induration    Assessment:        Hospital Problems         Last Modified POA    * (Principal) Hypertensive emergency 10/9/2021 Yes    Hypertension 10/9/2021 Yes    Hypertensive encephalopathy 10/9/2021 Yes    DEVYN (acute kidney injury) ([facility] [address] 10/9/2021 Yes    Hypoxia 10/9/2021 Yes    Troponin level elevated 10/9/2021 Yes    Obesity (BMI 30-39.9) 10/9/2021 Yes    Acute respiratory failure with hypoxia ([address] 10/9/2021 Yes          Plan:        1. Acute respiratory failure   2. Hypertensive emergency  3. Pulmonary HTN   4. CAP   5. Type 2 NSTEMI   6. DEVYN   7.  Metabolic acidosis   - cardiology on board   - no beds available in medical ICU thus pt currently admitted into TICU, stable for step down to med surg   - pulmonary on board   - pro rizwan normal   - legionella and strep pneumonia negative   - okay to d/c antibiotics given above   -

## 2021-11-20 ENCOUNTER — HEALTH MAINTENANCE LETTER (OUTPATIENT)
Age: 46
End: 2021-11-20

## 2021-12-21 ENCOUNTER — ANCILLARY PROCEDURE (OUTPATIENT)
Dept: MAMMOGRAPHY | Facility: CLINIC | Age: 46
End: 2021-12-21
Attending: NURSE PRACTITIONER
Payer: COMMERCIAL

## 2021-12-21 DIAGNOSIS — Z12.31 ENCOUNTER FOR SCREENING MAMMOGRAM FOR BREAST CANCER: ICD-10-CM

## 2021-12-21 PROCEDURE — 77063 BREAST TOMOSYNTHESIS BI: CPT | Mod: GC | Performed by: STUDENT IN AN ORGANIZED HEALTH CARE EDUCATION/TRAINING PROGRAM

## 2021-12-21 PROCEDURE — 77067 SCR MAMMO BI INCL CAD: CPT | Mod: GC | Performed by: STUDENT IN AN ORGANIZED HEALTH CARE EDUCATION/TRAINING PROGRAM

## 2021-12-28 ENCOUNTER — ALLIED HEALTH/NURSE VISIT (OUTPATIENT)
Dept: FAMILY MEDICINE | Facility: CLINIC | Age: 46
End: 2021-12-28
Payer: COMMERCIAL

## 2021-12-28 VITALS — DIASTOLIC BLOOD PRESSURE: 72 MMHG | SYSTOLIC BLOOD PRESSURE: 128 MMHG

## 2021-12-28 DIAGNOSIS — Z30.42 DEPO-PROVERA CONTRACEPTIVE STATUS: Primary | ICD-10-CM

## 2021-12-28 PROCEDURE — 99207 PR NO CHARGE NURSE ONLY: CPT

## 2021-12-28 PROCEDURE — 96372 THER/PROPH/DIAG INJ SC/IM: CPT | Performed by: NURSE PRACTITIONER

## 2021-12-28 RX ADMIN — MEDROXYPROGESTERONE ACETATE 150 MG: 150 INJECTION, SUSPENSION INTRAMUSCULAR at 10:00

## 2021-12-28 NOTE — NURSING NOTE
Clinic Administered Medication Documentation    Administrations This Visit     medroxyPROGESTERone (DEPO-PROVERA) injection 150 mg     Admin Date  12/28/2021 Action  Given Dose  150 mg Route  Intramuscular Site  Left Deltoid Administered By  Jose G Red MA    Ordering Provider: Lauren Mathew APRN CNP    Patient Supplied?: No                  Depo Provera Documentation    URINE HCG: not indicated    Depo-Provera Standing Order inclusion/exclusion criteria reviewed.   Patient meets: inclusion criteria     BP: 128/72  LAST PAP/EXAM:   Lab Results   Component Value Date    PAP NIL 10/16/2020       Prior to injection, verified patient identity using patient's name and date of birth. Medication was administered. Please see MAR and medication order for additional information.     Was entire vial of medication used? Yes  Vial/Syringe: Single dose vial  Expiration Date:  08/30/23    Patient instructed to remain in clinic for 15 minutes.    NEXT INJECTION DUE: 3/15/22 - 3/29/22    Jose G Red MA on 12/28/2021 at 10:02 AM

## 2022-03-22 ENCOUNTER — ALLIED HEALTH/NURSE VISIT (OUTPATIENT)
Dept: FAMILY MEDICINE | Facility: CLINIC | Age: 47
End: 2022-03-22
Payer: COMMERCIAL

## 2022-03-22 VITALS
RESPIRATION RATE: 18 BRPM | DIASTOLIC BLOOD PRESSURE: 78 MMHG | SYSTOLIC BLOOD PRESSURE: 128 MMHG | HEART RATE: 82 BPM | BODY MASS INDEX: 43.98 KG/M2 | OXYGEN SATURATION: 100 % | WEIGHT: 239 LBS | HEIGHT: 62 IN | TEMPERATURE: 97.2 F

## 2022-03-22 DIAGNOSIS — Z30.42 DEPO-PROVERA CONTRACEPTIVE STATUS: Primary | ICD-10-CM

## 2022-03-22 PROCEDURE — 99207 PR NO CHARGE NURSE ONLY: CPT

## 2022-03-22 NOTE — NURSING NOTE
BP: 128/78    LAST PAP/EXAM:   Lab Results   Component Value Date    PAP NIL 10/16/2020     URINE HCG:not indicated    The following medication was given:     MEDICATION: Depo Provera 150mg  ROUTE: IM  SITE: Deltoid - Right  : [place]  LOT #: [id number]  EXP:07/2023  NEXT INJECTION DUE: 6/7/22 - 6/21/22

## 2022-06-10 ASSESSMENT — ANXIETY QUESTIONNAIRES
6. BECOMING EASILY ANNOYED OR IRRITABLE: NOT AT ALL
4. TROUBLE RELAXING: NOT AT ALL
GAD7 TOTAL SCORE: 1
7. FEELING AFRAID AS IF SOMETHING AWFUL MIGHT HAPPEN: NOT AT ALL
7. FEELING AFRAID AS IF SOMETHING AWFUL MIGHT HAPPEN: NOT AT ALL
3. WORRYING TOO MUCH ABOUT DIFFERENT THINGS: NOT AT ALL
GAD7 TOTAL SCORE: 1
GAD7 TOTAL SCORE: 1
2. NOT BEING ABLE TO STOP OR CONTROL WORRYING: NOT AT ALL
8. IF YOU CHECKED OFF ANY PROBLEMS, HOW DIFFICULT HAVE THESE MADE IT FOR YOU TO DO YOUR WORK, TAKE CARE OF THINGS AT HOME, OR GET ALONG WITH OTHER PEOPLE?: NOT DIFFICULT AT ALL
5. BEING SO RESTLESS THAT IT IS HARD TO SIT STILL: NOT AT ALL
1. FEELING NERVOUS, ANXIOUS, OR ON EDGE: SEVERAL DAYS

## 2022-06-10 ASSESSMENT — PATIENT HEALTH QUESTIONNAIRE - PHQ9
10. IF YOU CHECKED OFF ANY PROBLEMS, HOW DIFFICULT HAVE THESE PROBLEMS MADE IT FOR YOU TO DO YOUR WORK, TAKE CARE OF THINGS AT HOME, OR GET ALONG WITH OTHER PEOPLE: NOT DIFFICULT AT ALL
SUM OF ALL RESPONSES TO PHQ QUESTIONS 1-9: 2
SUM OF ALL RESPONSES TO PHQ QUESTIONS 1-9: 2

## 2022-06-14 ENCOUNTER — ALLIED HEALTH/NURSE VISIT (OUTPATIENT)
Dept: FAMILY MEDICINE | Facility: CLINIC | Age: 47
End: 2022-06-14
Payer: COMMERCIAL

## 2022-06-14 DIAGNOSIS — Z30.42 DEPO-PROVERA CONTRACEPTIVE STATUS: Primary | ICD-10-CM

## 2022-06-14 PROCEDURE — 96372 THER/PROPH/DIAG INJ SC/IM: CPT | Performed by: NURSE PRACTITIONER

## 2022-06-14 PROCEDURE — 99207 PR NO CHARGE NURSE ONLY: CPT

## 2022-06-14 RX ADMIN — MEDROXYPROGESTERONE ACETATE 150 MG: 150 INJECTION, SUSPENSION INTRAMUSCULAR at 10:05

## 2022-06-14 NOTE — NURSING NOTE
Clinic Administered Medication Documentation    Administrations This Visit     medroxyPROGESTERone (DEPO-PROVERA) injection 150 mg     Admin Date  06/14/2022 Action  Given Dose  150 mg Route  Intramuscular Site  Left Deltoid Administered By  Nikki Lopez    Ordering Provider: Lauren Mathew APRN CNP    Patient Supplied?: No                  Depo Provera Documentation    URINE HCG: not indicated    Depo-Provera Standing Order inclusion/exclusion criteria reviewed.   Patient meets: inclusion criteria     BP: Data Unavailable  LAST PAP/EXAM:   Lab Results   Component Value Date    PAP NIL 10/16/2020       Prior to injection, verified patient identity using patient's name and date of birth. Medication was administered. Please see MAR and medication order for additional information.     Was entire vial of medication used? Yes  Vial/Syringe: Single dose vial  Expiration Date:  01/2024    NEXT INJECTION DUE: 8/30/22 - 9/13/22

## 2022-09-06 ENCOUNTER — ALLIED HEALTH/NURSE VISIT (OUTPATIENT)
Dept: FAMILY MEDICINE | Facility: CLINIC | Age: 47
End: 2022-09-06
Payer: COMMERCIAL

## 2022-09-06 VITALS — DIASTOLIC BLOOD PRESSURE: 74 MMHG | SYSTOLIC BLOOD PRESSURE: 126 MMHG

## 2022-09-06 DIAGNOSIS — Z30.42 DEPO-PROVERA CONTRACEPTIVE STATUS: Primary | ICD-10-CM

## 2022-09-06 PROCEDURE — 99207 PR NO CHARGE NURSE ONLY: CPT

## 2022-09-06 PROCEDURE — 96372 THER/PROPH/DIAG INJ SC/IM: CPT | Performed by: NURSE PRACTITIONER

## 2022-09-06 RX ADMIN — MEDROXYPROGESTERONE ACETATE 150 MG: 150 INJECTION, SUSPENSION INTRAMUSCULAR at 09:07

## 2022-09-06 NOTE — NURSING NOTE
Clinic Administered Medication Documentation          Depo Provera Documentation    URINE HCG: not indicated    Depo-Provera Standing Order inclusion/exclusion criteria reviewed.   Patient meets: inclusion criteria     BP: 126/74  LAST PAP/EXAM:   Lab Results   Component Value Date    PAP NIL 10/16/2020       Prior to injection, verified patient identity using patient's name and date of birth. Medication was administered. Please see MAR and medication order for additional information.     Was entire vial of medication used? Yes  Vial/Syringe: Single dose vial  Expiration Date:  04/2024    Patient instructed to remain in clinic for 15 minutes.    NEXT INJECTION DUE: 11/22/22 - 12/6/22    Jose G Red MA on 9/6/2022 at 9:07 AM

## 2022-11-28 ASSESSMENT — ENCOUNTER SYMPTOMS
NERVOUS/ANXIOUS: 0
DIZZINESS: 0
SHORTNESS OF BREATH: 0
HEMATURIA: 0
HEMATOCHEZIA: 0
ARTHRALGIAS: 0
FEVER: 0
ABDOMINAL PAIN: 0
HEARTBURN: 0
NAUSEA: 0
COUGH: 0
PALPITATIONS: 0
PARESTHESIAS: 0
HEADACHES: 0
CHILLS: 0
JOINT SWELLING: 0
BREAST MASS: 0
EYE PAIN: 0
DIARRHEA: 0
SORE THROAT: 0
MYALGIAS: 0
CONSTIPATION: 0
FREQUENCY: 0
WEAKNESS: 0
DYSURIA: 0

## 2022-11-29 ENCOUNTER — OFFICE VISIT (OUTPATIENT)
Dept: FAMILY MEDICINE | Facility: CLINIC | Age: 47
End: 2022-11-29
Payer: COMMERCIAL

## 2022-11-29 VITALS
BODY MASS INDEX: 43.06 KG/M2 | TEMPERATURE: 98.1 F | DIASTOLIC BLOOD PRESSURE: 82 MMHG | WEIGHT: 234 LBS | HEART RATE: 81 BPM | RESPIRATION RATE: 17 BRPM | HEIGHT: 62 IN | SYSTOLIC BLOOD PRESSURE: 134 MMHG | OXYGEN SATURATION: 100 %

## 2022-11-29 DIAGNOSIS — F33.42 RECURRENT MAJOR DEPRESSION IN COMPLETE REMISSION (H): ICD-10-CM

## 2022-11-29 DIAGNOSIS — E66.01 CLASS 3 SEVERE OBESITY WITH BODY MASS INDEX (BMI) OF 40.0 TO 44.9 IN ADULT, UNSPECIFIED OBESITY TYPE, UNSPECIFIED WHETHER SERIOUS COMORBIDITY PRESENT (H): ICD-10-CM

## 2022-11-29 DIAGNOSIS — Z12.31 ENCOUNTER FOR SCREENING MAMMOGRAM FOR BREAST CANCER: ICD-10-CM

## 2022-11-29 DIAGNOSIS — E66.813 CLASS 3 SEVERE OBESITY WITH BODY MASS INDEX (BMI) OF 40.0 TO 44.9 IN ADULT, UNSPECIFIED OBESITY TYPE, UNSPECIFIED WHETHER SERIOUS COMORBIDITY PRESENT (H): ICD-10-CM

## 2022-11-29 DIAGNOSIS — Z00.00 ROUTINE GENERAL MEDICAL EXAMINATION AT A HEALTH CARE FACILITY: Primary | ICD-10-CM

## 2022-11-29 DIAGNOSIS — Z30.42 DEPO-PROVERA CONTRACEPTIVE STATUS: ICD-10-CM

## 2022-11-29 PROBLEM — I82.402 ACUTE DEEP VEIN THROMBOSIS (DVT) OF LEFT LOWER EXTREMITY, UNSPECIFIED VEIN (H): Status: RESOLVED | Noted: 2017-09-15 | Resolved: 2022-11-29

## 2022-11-29 LAB
BASOPHILS # BLD AUTO: 0 10E3/UL (ref 0–0.2)
BASOPHILS NFR BLD AUTO: 0 %
CHOLEST SERPL-MCNC: 152 MG/DL
EOSINOPHIL # BLD AUTO: 0.1 10E3/UL (ref 0–0.7)
EOSINOPHIL NFR BLD AUTO: 1 %
ERYTHROCYTE [DISTWIDTH] IN BLOOD BY AUTOMATED COUNT: 13.1 % (ref 10–15)
FASTING STATUS PATIENT QL REPORTED: YES
HBA1C MFR BLD: 5.3 % (ref 0–5.6)
HCT VFR BLD AUTO: 43.9 % (ref 35–47)
HDLC SERPL-MCNC: 60 MG/DL
HGB BLD-MCNC: 14.4 G/DL (ref 11.7–15.7)
IMM GRANULOCYTES # BLD: 0 10E3/UL
IMM GRANULOCYTES NFR BLD: 0 %
LDLC SERPL CALC-MCNC: 83 MG/DL
LYMPHOCYTES # BLD AUTO: 3.1 10E3/UL (ref 0.8–5.3)
LYMPHOCYTES NFR BLD AUTO: 37 %
MCH RBC QN AUTO: 27.8 PG (ref 26.5–33)
MCHC RBC AUTO-ENTMCNC: 32.8 G/DL (ref 31.5–36.5)
MCV RBC AUTO: 85 FL (ref 78–100)
MONOCYTES # BLD AUTO: 0.6 10E3/UL (ref 0–1.3)
MONOCYTES NFR BLD AUTO: 7 %
NEUTROPHILS # BLD AUTO: 4.6 10E3/UL (ref 1.6–8.3)
NEUTROPHILS NFR BLD AUTO: 55 %
NONHDLC SERPL-MCNC: 92 MG/DL
PLATELET # BLD AUTO: 252 10E3/UL (ref 150–450)
RBC # BLD AUTO: 5.18 10E6/UL (ref 3.8–5.2)
TRIGL SERPL-MCNC: 47 MG/DL
TSH SERPL DL<=0.005 MIU/L-ACNC: 1.07 MU/L (ref 0.4–4)
WBC # BLD AUTO: 8.5 10E3/UL (ref 4–11)

## 2022-11-29 PROCEDURE — 85025 COMPLETE CBC W/AUTO DIFF WBC: CPT | Performed by: NURSE PRACTITIONER

## 2022-11-29 PROCEDURE — 99396 PREV VISIT EST AGE 40-64: CPT | Mod: 25 | Performed by: NURSE PRACTITIONER

## 2022-11-29 PROCEDURE — 36415 COLL VENOUS BLD VENIPUNCTURE: CPT | Performed by: NURSE PRACTITIONER

## 2022-11-29 PROCEDURE — 90746 HEPB VACCINE 3 DOSE ADULT IM: CPT | Performed by: NURSE PRACTITIONER

## 2022-11-29 PROCEDURE — 83036 HEMOGLOBIN GLYCOSYLATED A1C: CPT | Performed by: NURSE PRACTITIONER

## 2022-11-29 PROCEDURE — 90471 IMMUNIZATION ADMIN: CPT | Performed by: NURSE PRACTITIONER

## 2022-11-29 PROCEDURE — 96372 THER/PROPH/DIAG INJ SC/IM: CPT | Performed by: NURSE PRACTITIONER

## 2022-11-29 PROCEDURE — 80061 LIPID PANEL: CPT | Performed by: NURSE PRACTITIONER

## 2022-11-29 PROCEDURE — 84443 ASSAY THYROID STIM HORMONE: CPT | Performed by: NURSE PRACTITIONER

## 2022-11-29 RX ORDER — MEDROXYPROGESTERONE ACETATE 150 MG/ML
150 INJECTION, SUSPENSION INTRAMUSCULAR
Status: ACTIVE | OUTPATIENT
Start: 2022-11-29

## 2022-11-29 RX ADMIN — MEDROXYPROGESTERONE ACETATE 150 MG: 150 INJECTION, SUSPENSION INTRAMUSCULAR at 14:05

## 2022-11-29 ASSESSMENT — ENCOUNTER SYMPTOMS
SHORTNESS OF BREATH: 0
ABDOMINAL PAIN: 0
HEARTBURN: 0
COUGH: 0
DYSURIA: 0
HEADACHES: 0
DIARRHEA: 0
NERVOUS/ANXIOUS: 0
FREQUENCY: 0
PARESTHESIAS: 0
MYALGIAS: 0
HEMATOCHEZIA: 0
PALPITATIONS: 0
WEAKNESS: 0
DIZZINESS: 0
CONSTIPATION: 0
JOINT SWELLING: 0
FEVER: 0
EYE PAIN: 0
SORE THROAT: 0
NAUSEA: 0
BREAST MASS: 0
HEMATURIA: 0
CHILLS: 0
ARTHRALGIAS: 0

## 2022-11-29 ASSESSMENT — PATIENT HEALTH QUESTIONNAIRE - PHQ9
SUM OF ALL RESPONSES TO PHQ QUESTIONS 1-9: 2
SUM OF ALL RESPONSES TO PHQ QUESTIONS 1-9: 2
10. IF YOU CHECKED OFF ANY PROBLEMS, HOW DIFFICULT HAVE THESE PROBLEMS MADE IT FOR YOU TO DO YOUR WORK, TAKE CARE OF THINGS AT HOME, OR GET ALONG WITH OTHER PEOPLE: NOT DIFFICULT AT ALL

## 2022-11-29 ASSESSMENT — PAIN SCALES - GENERAL: PAINLEVEL: NO PAIN (0)

## 2022-11-29 NOTE — NURSING NOTE
Clinic Administered Medication Documentation    Administrations This Visit     medroxyPROGESTERone (DEPO-PROVERA) injection 150 mg     Admin Date  11/29/2022 Action  Given Dose  150 mg Route  Intramuscular Site   Administered By  Jose G Red MA    Ordering Provider: Lauren Mathew APRN CNP    Patient Supplied?: No                  Depo Provera Documentation    URINE HCG: not indicated    Depo-Provera Standing Order inclusion/exclusion criteria reviewed.   Patient meets: inclusion criteria     BP: 163/88  LAST PAP/EXAM:   Lab Results   Component Value Date    PAP NIL 10/16/2020       Prior to injection, verified patient identity using patient's name and date of birth. Medication was administered. Please see MAR and medication order for additional information.     Was entire vial of medication used? Yes  Vial/Syringe: Single dose vial  Expiration Date:  04/30/24      Patient instructed to remain in clinic for 15 minutes.    NEXT INJECTION DUE: 2/14/23 - 2/28/23

## 2022-11-29 NOTE — PROGRESS NOTES
SUBJECTIVE:   CC: Daya is an 46 year old who presents for preventive health visit.     Patient has been advised of split billing requirements and indicates understanding: Yes  Healthy Habits:     Getting at least 3 servings of Calcium per day:  Yes    Bi-annual eye exam:  Yes    Dental care twice a year:  Yes    Sleep apnea or symptoms of sleep apnea:  None    Diet:  Regular (no restrictions)    Frequency of exercise:  None    Taking medications regularly:  Yes    Medication side effects:  None    PHQ-2 Total Score: 0    Additional concerns today:  No          Due for depo orders.       Today's PHQ-2 Score:   PHQ-2 ( 1999 Pfizer) 11/28/2022   Q1: Little interest or pleasure in doing things 0   Q2: Feeling down, depressed or hopeless 0   PHQ-2 Score 0   PHQ-2 Total Score (12-17 Years)- Positive if 3 or more points; Administer PHQ-A if positive -   Q1: Little interest or pleasure in doing things Not at all   Q2: Feeling down, depressed or hopeless Not at all   PHQ-2 Score 0           Social History     Tobacco Use     Smoking status: Never     Smokeless tobacco: Never     Tobacco comments:     I don't smoke.   Substance Use Topics     Alcohol use: Not Currently     Comment: on holidays only          Alcohol Use 11/28/2022   Prescreen: >3 drinks/day or >7 drinks/week? No   Prescreen: >3 drinks/day or >7 drinks/week? -       Reviewed orders with patient.  Reviewed health maintenance and updated orders accordingly - Yes  Lab work is in process    Breast Cancer Screening:    Breast CA Risk Assessment (FHS-7) 9/28/2021   Do you have a family history of breast, colon, or ovarian cancer? No / Unknown         Mammogram Screening - Offered annual screening and updated Health Maintenance for mutual plan based on risk factor consideration    Pertinent mammograms are reviewed under the imaging tab.    History of abnormal Pap smear:   NO - age 30-65 PAP every 5 years with negative HPV co-testing recommended  Last 3 Pap Results:  "  PAP (no units)   Date Value   10/16/2020 NIL   08/24/2016 NIL   12/31/2013 NIL     Last 3 Pap and HPV Results:   PAP / HPV Latest Ref Rng & Units 10/16/2020 8/24/2016 12/31/2013   PAP (Historical) - NIL NIL NIL   HPV16 NEG:Negative Negative Negative -   HPV18 NEG:Negative Negative Negative -   HRHPV NEG:Negative Negative Negative -     PAP / HPV Latest Ref Rng & Units 10/16/2020 8/24/2016 12/31/2013   PAP (Historical) - NIL NIL NIL   HPV16 NEG:Negative Negative Negative -   HPV18 NEG:Negative Negative Negative -   HRHPV NEG:Negative Negative Negative -     Reviewed and updated as needed this visit by clinical staff   Tobacco  Allergies  Meds              Reviewed and updated as needed this visit by Provider                     Review of Systems   Constitutional: Negative for chills and fever.   HENT: Negative for congestion, ear pain, hearing loss and sore throat.    Eyes: Negative for pain and visual disturbance.   Respiratory: Negative for cough and shortness of breath.    Cardiovascular: Negative for chest pain, palpitations and peripheral edema.   Gastrointestinal: Negative for abdominal pain, constipation, diarrhea, heartburn, hematochezia and nausea.   Breasts:  Negative for tenderness, breast mass and discharge.   Genitourinary: Negative for dysuria, frequency, genital sores, hematuria, pelvic pain, urgency, vaginal bleeding and vaginal discharge.   Musculoskeletal: Negative for arthralgias, joint swelling and myalgias.   Skin: Negative for rash.   Neurological: Negative for dizziness, weakness, headaches and paresthesias.   Psychiatric/Behavioral: Negative for mood changes. The patient is not nervous/anxious.           OBJECTIVE:   /82   Pulse 81   Temp 98.1  F (36.7  C) (Temporal)   Resp 17   Ht 1.57 m (5' 1.81\")   Wt 106.1 kg (234 lb)   SpO2 100%   BMI 43.06 kg/m    Physical Exam  GENERAL: healthy, alert and no distress  EYES: Eyes grossly normal to inspection, PERRL and conjunctivae and " sclerae normal  HENT: ear canals and TM's normal, nose and mouth without ulcers or lesions  NECK: no adenopathy, no asymmetry, masses, or scars and thyroid normal to palpation  RESP: lungs clear to auscultation - no rales, rhonchi or wheezes  BREAST: normal without masses, tenderness or nipple discharge and no palpable axillary masses or adenopathy, post- surgical  CV: regular rate and rhythm, normal S1 S2, no S3 or S4, no murmur, click or rub, no peripheral edema and peripheral pulses strong  ABDOMEN: soft, nontender, no hepatosplenomegaly, no masses and bowel sounds normal   (female): normal female external genitalia and adnexae, and uterus without masses.  MS: no gross musculoskeletal defects noted, no edema  SKIN: no suspicious lesions or rashes  NEURO: Normal strength and tone, mentation intact and speech normal  PSYCH: mentation appears normal, affect normal/bright    Diagnostic Test Results:  Labs reviewed in Epic  Results for orders placed or performed in visit on 11/29/22   Hemoglobin A1c     Status: Normal   Result Value Ref Range    Hemoglobin A1C 5.3 0.0 - 5.6 %   Lipid panel reflex to direct LDL Fasting     Status: None   Result Value Ref Range    Cholesterol 152 <200 mg/dL    Triglycerides 47 <150 mg/dL    Direct Measure HDL 60 >=50 mg/dL    LDL Cholesterol Calculated 83 <=100 mg/dL    Non HDL Cholesterol 92 <130 mg/dL    Patient Fasting > 8hrs? Yes     Narrative    Cholesterol  Desirable:  <200 mg/dL    Triglycerides  Normal:  Less than 150 mg/dL  Borderline High:  150-199 mg/dL  High:  200-499 mg/dL  Very High:  Greater than or equal to 500 mg/dL    Direct Measure HDL  Female:  Greater than or equal to 50 mg/dL   Male:  Greater than or equal to 40 mg/dL    LDL Cholesterol  Desirable:  <100mg/dL  Above Desirable:  100-129 mg/dL   Borderline High:  130-159 mg/dL   High:  160-189 mg/dL   Very High:  >= 190 mg/dL    Non HDL Cholesterol  Desirable:  130 mg/dL  Above Desirable:  130-159 mg/dL  Borderline  High:  160-189 mg/dL  High:  190-219 mg/dL  Very High:  Greater than or equal to 220 mg/dL   TSH with free T4 reflex     Status: Normal   Result Value Ref Range    TSH 1.07 0.40 - 4.00 mU/L   CBC with platelets and differential     Status: None   Result Value Ref Range    WBC Count 8.5 4.0 - 11.0 10e3/uL    RBC Count 5.18 3.80 - 5.20 10e6/uL    Hemoglobin 14.4 11.7 - 15.7 g/dL    Hematocrit 43.9 35.0 - 47.0 %    MCV 85 78 - 100 fL    MCH 27.8 26.5 - 33.0 pg    MCHC 32.8 31.5 - 36.5 g/dL    RDW 13.1 10.0 - 15.0 %    Platelet Count 252 150 - 450 10e3/uL    % Neutrophils 55 %    % Lymphocytes 37 %    % Monocytes 7 %    % Eosinophils 1 %    % Basophils 0 %    % Immature Granulocytes 0 %    Absolute Neutrophils 4.6 1.6 - 8.3 10e3/uL    Absolute Lymphocytes 3.1 0.8 - 5.3 10e3/uL    Absolute Monocytes 0.6 0.0 - 1.3 10e3/uL    Absolute Eosinophils 0.1 0.0 - 0.7 10e3/uL    Absolute Basophils 0.0 0.0 - 0.2 10e3/uL    Absolute Immature Granulocytes 0.0 <=0.4 10e3/uL   CBC with Platelets & Differential     Status: None    Narrative    The following orders were created for panel order CBC with Platelets & Differential.  Procedure                               Abnormality         Status                     ---------                               -----------         ------                     CBC with platelets and d...[[id number]]                      Final result                 Please view results for these tests on the individual orders.       ASSESSMENT/PLAN:       ICD-10-CM    1. Routine general medical examination at a health care facility  Z00.00 CBC with Platelets & Differential     Hemoglobin A1c     Lipid panel reflex to direct LDL Fasting     TSH with free T4 reflex     CBC with Platelets & Differential     Hemoglobin A1c     Lipid panel reflex to direct LDL Fasting     TSH with free T4 reflex      2. Encounter for screening mammogram for breast cancer  Z12.31 *MA Screening Digital Bilateral      3. Class 3 severe  "obesity with body mass index (BMI) of 40.0 to 44.9 in adult, unspecified obesity type, unspecified whether serious comorbidity present (H)  E66.01     Z68.41       4. Recurrent major depression in complete remission (H)  F33.42       5. Depo-Provera contraceptive status  Z30.42 medroxyPROGESTERone (DEPO-PROVERA) injection 150 mg          Patient has been advised of split billing requirements and indicates understanding: Yes      COUNSELING:  Reviewed preventive health counseling, as reflected in patient instructions       Regular exercise       Healthy diet/nutrition       Immunizations    declined  Others, OK with Hep. B           Contraception       Safe sex practices/STD prevention       Depression- remission, active monitoring      BMI:   Estimated body mass index is 43.06 kg/m  as calculated from the following:    Height as of this encounter: 1.57 m (5' 1.81\").    Weight as of this encounter: 106.1 kg (234 lb).   Weight management plan: Discussed healthy diet and exercise guidelines pt. julio referrals      She reports that she has never smoked. She has never used smokeless tobacco.          JAVID Ordonez [facility] DAVID  Answers for HPI/ROS submitted by the patient on 11/29/2022  If you checked off any problems, how difficult have these problems made it for you to do your work, take care of things at home, or get along with other people?: Not difficult at all  PHQ9 TOTAL SCORE: 2      "

## 2022-12-20 ENCOUNTER — MYC MEDICAL ADVICE (OUTPATIENT)
Dept: FAMILY MEDICINE | Facility: CLINIC | Age: 47
End: 2022-12-20

## 2022-12-20 DIAGNOSIS — Z12.11 SCREENING FOR COLON CANCER: Primary | ICD-10-CM

## 2022-12-20 NOTE — TELEPHONE ENCOUNTER
LM- Explained Hep B series, and discussed colon cancer screening.     Pt. To call with preference of Cologuard or colonoscopy.     JAVID Ordonez CNP

## 2023-01-02 ENCOUNTER — ANCILLARY PROCEDURE (OUTPATIENT)
Dept: MAMMOGRAPHY | Facility: CLINIC | Age: 48
End: 2023-01-02
Attending: NURSE PRACTITIONER
Payer: COMMERCIAL

## 2023-01-02 DIAGNOSIS — Z12.31 ENCOUNTER FOR SCREENING MAMMOGRAM FOR BREAST CANCER: ICD-10-CM

## 2023-01-02 PROCEDURE — 77063 BREAST TOMOSYNTHESIS BI: CPT

## 2023-01-02 PROCEDURE — 77067 SCR MAMMO BI INCL CAD: CPT

## 2023-02-14 ENCOUNTER — TELEPHONE (OUTPATIENT)
Dept: GASTROENTEROLOGY | Facility: CLINIC | Age: 48
End: 2023-02-14
Payer: COMMERCIAL

## 2023-02-14 NOTE — TELEPHONE ENCOUNTER
"    Screening Questions  BLUE  KIND OF PREP RED  LOCATION [review exclusion criteria] GREEN  SEDATION TYPE        Y Are you active on mychart?       DARBY SALGADO Ordering/Referring Provider?        [facility] What type of coverage do you have?      N Have you had a positive covid test in the last 14 days?     44.2 1. BMI  [BMI 40+ - review exclusion criteria]    Y  2. Are you able to give consent for your medical care? [IF NO,RN REVIEW]          N  3. Are you taking any prescription pain medications on a routine schedule   (ex narcotics: oxycodone, roxicodone, oxycontin,  and percocet)? [RN Review]          3a. EXTENDED PREP What kind of prescription?     N 4. Do you have any chemical dependencies such as alcohol, street drugs, or methadone?        **If yes 3- 5 , please schedule with MAC sedation.**          IF YES TO ANY 6 - 10 - HOSPITAL SETTING ONLY.     N 6.   Do you need assistance transferring?     N 7.   Have you had a heart or lung transplant?    N 8.   Are you currently on dialysis?   N 9.   Do you use daily home oxygen?   N 10. Do you take nitroglycerin?   10a.  If yes, how often?     11. [FEMALES]  N Are you currently pregnant?    11a.  If yes, how many weeks? [ Greater than 12 weeks, OR NEEDED]    N 12. Do you have Pulmonary Hypertension? *NEED PAC APPT AT [facility] w/ MAC*     N 13. [review exclusion criteria]  Do you have any implantable devices in your body (pacemaker, defib, LVAD)?    N 14. In the past 6 months, have you had any heart related issues including cardiomyopathy or heart attack?     14a. = If yes, did it require cardiac stenting if so when?     N 15. Have you had a stroke or Transient ischemic attack (TIA - aka  mini stroke ) within 6 months?      N 16. Do you have mod to severe Obstructive Sleep Apnea?  [Hospital only]    N 17. Do you have SEVERE AND UNCONTROLLED asthma? *NEED PAC APPT AT [facility] w/MAC*     N 18. Are you currently taking any blood thinners?     18a. If yes, inform patient to \"follow " "up w/ ordering provider for bridging instructions.\"    N 19. Do you take the medication Phentermine?    19a. If yes, \"Hold for 7 days before procedure.  Please consult your prescribing provider if you have questions about holding this medication.\"     N  20. Do you have chronic kidney disease?      N  21. Do you have a diagnosis of diabetes?     N  22. On a regular basis do you go 3-5 days between bowel movements?      23. Preferred LOCAL Pharmacy for Pre Prescription    [ LIST ONLY ONE PHARMACY]       [place] [id number] - [facility] [address]        - CLOSING REMINDERS -    Informed patient they will need an adult    Cannot take any type of public or medical transportation alone    Conscious Sedation- Needs  for 6 hours after the procedure       MAC/General-Needs  for 24 hours after procedure    Pre-Procedure Covid test to be completed [[facility] PCR Testing Required]    Confirmed Nurse will call to complete assessment       - SCHEDULING DETAILS -  NO Hospital Setting Required? If yes, what is the exclusion?:    GARO  Surgeon    4/18  Date of Procedure  Lower Endoscopy [Colonoscopy]  Type of Procedure Scheduled  [facility] EXTENDED - If you answer yes to questions #1, #3, #22 ([place] and CF pts)Which Colonoscopy Prep was Sent?     CS Sedation Type     NO PAC / Pre-op Required                 "

## 2023-02-21 ENCOUNTER — ALLIED HEALTH/NURSE VISIT (OUTPATIENT)
Dept: FAMILY MEDICINE | Facility: CLINIC | Age: 48
End: 2023-02-21
Payer: COMMERCIAL

## 2023-02-21 DIAGNOSIS — Z30.42 ENCOUNTER FOR DEPO-PROVERA CONTRACEPTION: Primary | ICD-10-CM

## 2023-02-21 PROCEDURE — 99207 PR NO CHARGE NURSE ONLY: CPT

## 2023-02-21 PROCEDURE — 96372 THER/PROPH/DIAG INJ SC/IM: CPT | Performed by: NURSE PRACTITIONER

## 2023-02-21 RX ADMIN — MEDROXYPROGESTERONE ACETATE 150 MG: 150 INJECTION, SUSPENSION INTRAMUSCULAR at 10:09

## 2023-02-21 NOTE — PROGRESS NOTES
Chief Complaint   Patient presents with     Allied Health Visit     Depo #2 with current order     Clinic Administered Medication Documentation          Depo Provera Documentation    URINE HCG: not indicated    Depo-Provera Standing Order inclusion/exclusion criteria reviewed.   Patient meets: inclusion criteria     BP: Data Unavailable  LAST PAP/EXAM:   Lab Results   Component Value Date    PAP NIL 10/16/2020       Prior to injection, verified patient identity using patient's name and date of birth. Medication was administered. Please see MAR and medication order for additional information.     Was entire vial of medication used? Yes  Vial/Syringe: Single dose vial  Expiration Date:  5/2024    Patient instructed to remain in clinic for 15 minutes, report any adverse reaction to staff immediately  and stay in clinic after the injection but patient declined.  NEXT INJECTION DUE: 5/9/23 - 5/23/23      Kristan Ulloa CMA ([facility])

## 2023-04-05 ENCOUNTER — TELEPHONE (OUTPATIENT)
Dept: GASTROENTEROLOGY | Facility: CLINIC | Age: 48
End: 2023-04-05
Payer: COMMERCIAL

## 2023-04-05 DIAGNOSIS — Z12.11 ENCOUNTER FOR SCREENING COLONOSCOPY: Primary | ICD-10-CM

## 2023-04-05 RX ORDER — BISACODYL 5 MG/1
TABLET, DELAYED RELEASE ORAL
Qty: 4 TABLET | Refills: 0 | Status: SHIPPED | OUTPATIENT
Start: 2023-04-05 | End: 2023-12-05

## 2023-04-05 NOTE — TELEPHONE ENCOUNTER
Attempted to contact patient regarding upcoming Colonoscopy  procedure on 4/18/23 for pre assessment questions. No answer.     Left message to return call to [phone] #4    Discuss Covid policy and designated  policy.    Pre op exam? N/A    Arrival time: 0720. Procedure time: 0805    Facility location: [facility]; [address], [place] [address]    Sedation type: Conscious sedation     Anticoagulants: No    Electronic implanted devices? No    Diabetic? No    Indication for procedure: screening colonoscopy    Bowel prep recommendation: Extended prep Golytely  d/t BMI 43.06    Prep instructions sent via [employer]yl[employer]. Bowel prep script sent to    [facility] [address] [place] [address] - [address]    Leah Pretty RN  Endoscopy Procedure Pre Assessment RN

## 2023-04-05 NOTE — TELEPHONE ENCOUNTER
Patient calling back for pre assessment. Patient states she was called by [facility] pharmacy to let her know her insurance does not cover the prep, pharmacy also states golytley is on back order. Patient is prescribed the extended prep due to BMI.     Patient was advised to contact her insurance to see if it was a mistake and to find a different pharmacy that has golytley.     Patient will contact pre assessment staff to do PA once she finds out more from her insurance company.     Blanca Wellington RN  Endoscopy Procedure Pre Assessment RN

## 2023-04-07 NOTE — TELEPHONE ENCOUNTER
Pre assessment questions completed for upcoming Colonoscopy  procedure scheduled on 4.18.23    COVID policy reviewed.     Reviewed procedural arrival time 0720 and facility location [facility]; [address], [place] [address]    Designated  policy reviewed. Instructed to have someone stay 6 hours post procedure.     Anticoagulation/blood thinners? No    Electronic implanted devices? No    Diabetic? No    Reviewed procedure prep instructions.     Patient verbalized understanding and had no questions or concerns at this time.    Lizzie Nuñez RN  Endoscopy Procedure Pre Assessment RN

## 2023-04-07 NOTE — TELEPHONE ENCOUNTER
Second attempt for pre-assessment prior to upcoming colonoscopy     Patient stated they cannot complete pre assessment at this time, they are busy and will call back later.    Patient did state they were able to get the bowel prep prescriptions through a [facility] pharmacy after contacting their insurance company. Patient stated bowel prep needed to be filled at [facility]. Patient stated they got a notification from [facility] that bowel prep prescription is ready for pickup and they plan to pick that up later.     Confirmed patient has callback number for pre assessment team  [phone], option [phone]      Marjorie De La O RN  Endoscopy Procedure Pre Assessment RN

## 2023-04-08 ENCOUNTER — MYC MEDICAL ADVICE (OUTPATIENT)
Dept: FAMILY MEDICINE | Facility: CLINIC | Age: 48
End: 2023-04-08
Payer: COMMERCIAL

## 2023-04-17 NOTE — TELEPHONE ENCOUNTER
Patient calling wanting to know if they can take any medication today for a headache. Informed patient that tylenol or acetaminophen is fine to take today.     Patient had no further questions at this time.    Leah Pretty RN  Endoscopy Procedure Pre Assessment RN

## 2023-04-18 ENCOUNTER — HOSPITAL ENCOUNTER (OUTPATIENT)
Facility: AMBULATORY SURGERY CENTER | Age: 48
Discharge: HOME | End: 2023-04-18
Attending: INTERNAL MEDICINE | Admitting: INTERNAL MEDICINE
Payer: COMMERCIAL

## 2023-04-18 VITALS
RESPIRATION RATE: 16 BRPM | HEART RATE: 101 BPM | OXYGEN SATURATION: 100 % | SYSTOLIC BLOOD PRESSURE: 121 MMHG | DIASTOLIC BLOOD PRESSURE: 76 MMHG | TEMPERATURE: 97 F | WEIGHT: 220 LBS | BODY MASS INDEX: 40.48 KG/M2

## 2023-04-18 LAB — COLONOSCOPY: NORMAL

## 2023-04-18 PROCEDURE — G8907 PT DOC NO EVENTS ON DISCHARG: HCPCS

## 2023-04-18 PROCEDURE — G8918 PT W/O PREOP ORDER IV AB PRO: HCPCS

## 2023-04-18 PROCEDURE — 45378 DIAGNOSTIC COLONOSCOPY: CPT

## 2023-04-18 RX ORDER — LIDOCAINE 40 MG/G
CREAM TOPICAL
Status: DISCONTINUED | OUTPATIENT
Start: 2023-04-18 | End: 2023-04-19 | Stop reason: HOSPADM

## 2023-04-18 RX ORDER — NALOXONE HYDROCHLORIDE 0.4 MG/ML
0.4 INJECTION, SOLUTION INTRAMUSCULAR; INTRAVENOUS; SUBCUTANEOUS
Status: DISCONTINUED | OUTPATIENT
Start: 2023-04-18 | End: 2023-04-19 | Stop reason: HOSPADM

## 2023-04-18 RX ORDER — NALOXONE HYDROCHLORIDE 0.4 MG/ML
0.2 INJECTION, SOLUTION INTRAMUSCULAR; INTRAVENOUS; SUBCUTANEOUS
Status: DISCONTINUED | OUTPATIENT
Start: 2023-04-18 | End: 2023-04-19 | Stop reason: HOSPADM

## 2023-04-18 RX ORDER — DIPHENHYDRAMINE HYDROCHLORIDE 50 MG/ML
INJECTION INTRAMUSCULAR; INTRAVENOUS PRN
Status: DISCONTINUED | OUTPATIENT
Start: 2023-04-18 | End: 2023-04-18 | Stop reason: HOSPADM

## 2023-04-18 RX ORDER — ONDANSETRON 2 MG/ML
4 INJECTION INTRAMUSCULAR; INTRAVENOUS EVERY 6 HOURS PRN
Status: DISCONTINUED | OUTPATIENT
Start: 2023-04-18 | End: 2023-04-19 | Stop reason: HOSPADM

## 2023-04-18 RX ORDER — ONDANSETRON 4 MG/1
4 TABLET, ORALLY DISINTEGRATING ORAL EVERY 6 HOURS PRN
Status: DISCONTINUED | OUTPATIENT
Start: 2023-04-18 | End: 2023-04-19 | Stop reason: HOSPADM

## 2023-04-18 RX ORDER — ONDANSETRON 2 MG/ML
4 INJECTION INTRAMUSCULAR; INTRAVENOUS
Status: DISCONTINUED | OUTPATIENT
Start: 2023-04-18 | End: 2023-04-19 | Stop reason: HOSPADM

## 2023-04-18 RX ORDER — FLUMAZENIL 0.1 MG/ML
0.2 INJECTION, SOLUTION INTRAVENOUS
Status: ACTIVE | OUTPATIENT
Start: 2023-04-18 | End: 2023-04-18

## 2023-04-18 RX ORDER — FENTANYL CITRATE 50 UG/ML
INJECTION, SOLUTION INTRAMUSCULAR; INTRAVENOUS PRN
Status: DISCONTINUED | OUTPATIENT
Start: 2023-04-18 | End: 2023-04-18 | Stop reason: HOSPADM

## 2023-04-18 RX ORDER — PROCHLORPERAZINE MALEATE 10 MG
10 TABLET ORAL EVERY 6 HOURS PRN
Status: DISCONTINUED | OUTPATIENT
Start: 2023-04-18 | End: 2023-04-19 | Stop reason: HOSPADM

## 2023-04-18 NOTE — H&P
[facility] Anesthesia Pre-op History and Physical    Sandy Floyd MRN# [id number]   Age: 47 year old YOB: 1975            Date of Exam 4/18/2023         Primary care provider: Lauren Mathew         Chief Complaint and/or Reason for Procedure:     CRC screening - first colonoscopy         Active problem list:     Patient Active Problem List    Diagnosis Date Noted     Snoring 10/01/2021     Priority: Medium     Morbid obesity (H) 03/14/2019     Priority: Medium     Acne, unspecified acne type 05/19/2017     Priority: Medium     Plantar fasciitis 12/31/2013     Priority: Medium     Anxiety state 03/07/2013     Priority: Medium     Problem list name updated by automated process. Provider to review       CARDIOVASCULAR SCREENING; LDL GOAL LESS THAN 160 10/31/2010     Priority: Medium     Pain in joint, pelvic region and thigh 08/26/2009     Priority: Medium     Muscle weakness (generalized) 08/26/2009     Priority: Medium     Cholelithiasis 08/14/2009     Priority: Medium     IMO update changed this record. Please review for accuracy       Dyspareunia 08/14/2009     Priority: Medium     Pancreatitis, acute 08/13/2009     Priority: Medium     (Problem list name updated by automated process. Provider to review and confirm.)       Back pain      Priority: Medium     Obesity      Priority: Medium     Problem list name updated by automated process. Provider to review              Medications (include herbals and vitamins):   Any Plavix use in the last 7 days? No     Current Outpatient Medications   Medication Sig     Ascorbic Acid (VITAMIN C) 500 MG CAPS      Biotin 98599 MCG TABS      Cholecalciferol (VITAMIN D3 PO) Take 1 tablet by mouth daily     Omega-3 Fatty Acids (FISH OIL OMEGA-3 PO) Take 1 tablet by mouth daily      bisacodyl (DULCOLAX) 5 MG EC tablet Two days prior to exam take two (2) tablets at 4pm. One day prior to exam take two (2) tablets at 4pm     polyethylene glycol (GOLYTELY)  236 g suspension Take as directed. Two days before your exam fill the first container with water. Cover and shake until mixed well. At 5:00pm drink one 8oz glass every 10-15 minutes until half (1/2) of the first container is empty. Store the remainder in the refrigerator. One day before your exam at 5:00pm drink the second half of the first container until it is gone. Before you go to bed mix the second container with water and put in refrigerator. Six hours before your check in time drink one 8oz glass every 10-15 minutes until half of container is empty. Discard the remainder of solution.     Current Facility-Administered Medications   Medication     lidocaine (LMX4) kit     lidocaine 1 % 0.1-1 mL     medroxyPROGESTERone (DEPO-PROVERA) injection 150 mg     ondansetron (ZOFRAN) injection 4 mg     sodium chloride (PF) 0.9% PF flush 3 mL     sodium chloride (PF) 0.9% PF flush 3 mL             Allergies:      Allergies   Allergen Reactions     No Known Drug Allergies      Allergy to Latex? No  Allergy to tape?   No  Intolerances:             Physical Exam:   All vitals have been reviewed  Patient Vitals for the past 8 hrs:   BP Temp Temp src Resp SpO2 Weight   04/18/23 0742 (!) 141/85 97.7  F (36.5  C) Temporal 16 97 % 99.8 kg (220 lb)     No intake/output data recorded.  Lungs:   No increased work of breathing, good air exchange, clear to auscultation bilaterally, no crackles or wheezing     Cardiovascular:   normal S1 and S2             Lab / Radiology Results:            Anesthetic risk and/or ASA classification:     2  Sherry Cordoba DO

## 2023-05-16 ENCOUNTER — ALLIED HEALTH/NURSE VISIT (OUTPATIENT)
Dept: FAMILY MEDICINE | Facility: CLINIC | Age: 48
End: 2023-05-16
Payer: COMMERCIAL

## 2023-05-16 VITALS — SYSTOLIC BLOOD PRESSURE: 128 MMHG | OXYGEN SATURATION: 99 % | DIASTOLIC BLOOD PRESSURE: 82 MMHG | HEART RATE: 63 BPM

## 2023-05-16 DIAGNOSIS — Z30.42 ENCOUNTER FOR DEPO-PROVERA CONTRACEPTION: Primary | ICD-10-CM

## 2023-05-16 PROCEDURE — 96372 THER/PROPH/DIAG INJ SC/IM: CPT | Performed by: NURSE PRACTITIONER

## 2023-05-16 PROCEDURE — 99207 PR NO CHARGE NURSE ONLY: CPT

## 2023-05-16 RX ADMIN — MEDROXYPROGESTERONE ACETATE 150 MG: 150 INJECTION, SUSPENSION INTRAMUSCULAR at 09:56

## 2023-05-16 ASSESSMENT — PATIENT HEALTH QUESTIONNAIRE - PHQ9: SUM OF ALL RESPONSES TO PHQ QUESTIONS 1-9: 2

## 2023-05-16 NOTE — NURSING NOTE
Clinic Administered Medication Documentation        Patient was given Depo Provera. Prior to medication administration, verified patient's identity using patient s name and date of birth. Please see MAR and medication order for additional information. Patient instructed to remain in clinic for 15 minutes and report any adverse reaction to staff immediately.    Vial/Syringe: Single dose vial. Was entire vial of medication used? Yes       NEXT INJECTION DUE: 8/1/23 - 8/29/23    Jose G Red MA on 5/16/2023 at 9:57 AM

## 2023-08-22 ENCOUNTER — ALLIED HEALTH/NURSE VISIT (OUTPATIENT)
Dept: FAMILY MEDICINE | Facility: CLINIC | Age: 48
End: 2023-08-22
Payer: COMMERCIAL

## 2023-08-22 DIAGNOSIS — Z30.42 ENCOUNTER FOR DEPO-PROVERA CONTRACEPTION: Primary | ICD-10-CM

## 2023-08-22 PROCEDURE — 96372 THER/PROPH/DIAG INJ SC/IM: CPT | Performed by: NURSE PRACTITIONER

## 2023-08-22 PROCEDURE — 99207 PR NO CHARGE NURSE ONLY: CPT

## 2023-08-22 RX ADMIN — MEDROXYPROGESTERONE ACETATE 150 MG: 150 INJECTION, SUSPENSION INTRAMUSCULAR at 09:54

## 2023-08-22 NOTE — PROGRESS NOTES
Chief Complaint   Patient presents with    Allied Health Visit     Depo #4     Clinic Administered Medication Documentation      Depo Provera Documentation    Depo-Provera Standing Order inclusion/exclusion criteria reviewed.     Is this the initial or subsequent dose of Depo Provera? Subsequent dose - patient is within the acceptable window of time (11-15 weeks) for subsequent injection. Pregnancy test not indicated.    Patient meets: inclusion criteria     Is there an active order (written within the past 365 days, with administrations remaining, not ) in the chart? Yes.     Prior to injection, verified patient identity using patient's name and date of birth. Medication was administered. Please see MAR and medication order for additional information.     Vial/Syringe: Single dose vial. Was entire vial of medication used? Yes    Patient instructed to remain in clinic for 15 minutes and report any adverse reaction to staff immediately but patient declined.  NEXT INJECTION DUE: 23 - 23    Patient has no refills remaining.  Patient informed to schedule annual physical and have provider renew order at that time.      Kristan Ulloa CMA ([facility])

## 2023-12-02 ASSESSMENT — ENCOUNTER SYMPTOMS
BREAST MASS: 0
SORE THROAT: 0
DYSURIA: 0
HEARTBURN: 0
HEADACHES: 0
JOINT SWELLING: 0
MYALGIAS: 0
NERVOUS/ANXIOUS: 0
CONSTIPATION: 0
NAUSEA: 0
SHORTNESS OF BREATH: 0
PALPITATIONS: 0
EYE PAIN: 0
DIARRHEA: 0
HEMATOCHEZIA: 0
ARTHRALGIAS: 0
ABDOMINAL PAIN: 0
FEVER: 0
PARESTHESIAS: 0
FREQUENCY: 0
HEMATURIA: 0
CHILLS: 0
DIZZINESS: 0
COUGH: 0
WEAKNESS: 0

## 2023-12-05 ENCOUNTER — OFFICE VISIT (OUTPATIENT)
Dept: FAMILY MEDICINE | Facility: CLINIC | Age: 48
End: 2023-12-05
Payer: COMMERCIAL

## 2023-12-05 VITALS
WEIGHT: 226 LBS | OXYGEN SATURATION: 100 % | DIASTOLIC BLOOD PRESSURE: 80 MMHG | SYSTOLIC BLOOD PRESSURE: 130 MMHG | HEIGHT: 62 IN | TEMPERATURE: 97.8 F | RESPIRATION RATE: 16 BRPM | BODY MASS INDEX: 41.59 KG/M2 | HEART RATE: 84 BPM

## 2023-12-05 DIAGNOSIS — E66.01 MORBID OBESITY (H): ICD-10-CM

## 2023-12-05 DIAGNOSIS — Z00.00 ROUTINE GENERAL MEDICAL EXAMINATION AT A HEALTH CARE FACILITY: Primary | ICD-10-CM

## 2023-12-05 DIAGNOSIS — Z30.42 DEPO-PROVERA CONTRACEPTIVE STATUS: ICD-10-CM

## 2023-12-05 LAB
BASOPHILS # BLD AUTO: 0.1 10E3/UL (ref 0–0.2)
BASOPHILS NFR BLD AUTO: 1 %
EOSINOPHIL # BLD AUTO: 0.1 10E3/UL (ref 0–0.7)
EOSINOPHIL NFR BLD AUTO: 1 %
ERYTHROCYTE [DISTWIDTH] IN BLOOD BY AUTOMATED COUNT: 12.8 % (ref 10–15)
HBA1C MFR BLD: 5 % (ref 0–5.6)
HCT VFR BLD AUTO: 43.4 % (ref 35–47)
HGB BLD-MCNC: 14.3 G/DL (ref 11.7–15.7)
IMM GRANULOCYTES # BLD: 0 10E3/UL
IMM GRANULOCYTES NFR BLD: 0 %
LYMPHOCYTES # BLD AUTO: 3.9 10E3/UL (ref 0.8–5.3)
LYMPHOCYTES NFR BLD AUTO: 40 %
MCH RBC QN AUTO: 28.3 PG (ref 26.5–33)
MCHC RBC AUTO-ENTMCNC: 32.9 G/DL (ref 31.5–36.5)
MCV RBC AUTO: 86 FL (ref 78–100)
MONOCYTES # BLD AUTO: 0.7 10E3/UL (ref 0–1.3)
MONOCYTES NFR BLD AUTO: 7 %
NEUTROPHILS # BLD AUTO: 5 10E3/UL (ref 1.6–8.3)
NEUTROPHILS NFR BLD AUTO: 51 %
PLATELET # BLD AUTO: 276 10E3/UL (ref 150–450)
RBC # BLD AUTO: 5.06 10E6/UL (ref 3.8–5.2)
WBC # BLD AUTO: 9.7 10E3/UL (ref 4–11)

## 2023-12-05 PROCEDURE — 96372 THER/PROPH/DIAG INJ SC/IM: CPT | Performed by: NURSE PRACTITIONER

## 2023-12-05 PROCEDURE — 99396 PREV VISIT EST AGE 40-64: CPT | Mod: 25 | Performed by: NURSE PRACTITIONER

## 2023-12-05 PROCEDURE — 80061 LIPID PANEL: CPT | Performed by: NURSE PRACTITIONER

## 2023-12-05 PROCEDURE — 36415 COLL VENOUS BLD VENIPUNCTURE: CPT | Performed by: NURSE PRACTITIONER

## 2023-12-05 PROCEDURE — 85025 COMPLETE CBC W/AUTO DIFF WBC: CPT | Performed by: NURSE PRACTITIONER

## 2023-12-05 PROCEDURE — 83036 HEMOGLOBIN GLYCOSYLATED A1C: CPT | Performed by: NURSE PRACTITIONER

## 2023-12-05 RX ORDER — MEDROXYPROGESTERONE ACETATE 150 MG/ML
150 INJECTION, SUSPENSION INTRAMUSCULAR
Status: COMPLETED | OUTPATIENT
Start: 2023-12-05 | End: 2024-09-13

## 2023-12-05 RX ADMIN — MEDROXYPROGESTERONE ACETATE 150 MG: 150 INJECTION, SUSPENSION INTRAMUSCULAR at 15:31

## 2023-12-05 ASSESSMENT — ENCOUNTER SYMPTOMS
COUGH: 0
PALPITATIONS: 0
HEARTBURN: 0
DYSURIA: 0
JOINT SWELLING: 0
HEADACHES: 0
DIARRHEA: 0
BREAST MASS: 0
CHILLS: 0
PARESTHESIAS: 0
WEAKNESS: 0
EYE PAIN: 0
NAUSEA: 0
HEMATOCHEZIA: 0
ARTHRALGIAS: 0
ABDOMINAL PAIN: 0
NERVOUS/ANXIOUS: 0
FEVER: 0
FREQUENCY: 0
SORE THROAT: 0
DIZZINESS: 0
HEMATURIA: 0
CONSTIPATION: 0
SHORTNESS OF BREATH: 0
MYALGIAS: 0

## 2023-12-05 ASSESSMENT — PATIENT HEALTH QUESTIONNAIRE - PHQ9
SUM OF ALL RESPONSES TO PHQ QUESTIONS 1-9: 0
10. IF YOU CHECKED OFF ANY PROBLEMS, HOW DIFFICULT HAVE THESE PROBLEMS MADE IT FOR YOU TO DO YOUR WORK, TAKE CARE OF THINGS AT HOME, OR GET ALONG WITH OTHER PEOPLE: NOT DIFFICULT AT ALL
SUM OF ALL RESPONSES TO PHQ QUESTIONS 1-9: 0

## 2023-12-05 ASSESSMENT — PAIN SCALES - GENERAL: PAINLEVEL: NO PAIN (0)

## 2023-12-05 NOTE — PROGRESS NOTES
Follow Up Injection    Patient returning during stated date range given at previous visit: {No, So I.....:877024}      If here at the correct interval:   BP Readings from Last 1 Encounters:   12/05/23 130/80     Wt Readings from Last 1 Encounters:   12/05/23 102.5 kg (226 lb)       Last Pap/exam date: ***      Side effects or problems with last injection?  {NO, SO I........:247079}  Date range is given to patient for next dose: ***    See Medication Note for administration information    Staff Sig: ***

## 2023-12-05 NOTE — NURSING NOTE
Administrations This Visit       medroxyPROGESTERone (DEPO-PROVERA) injection 150 mg       Admin Date  12/05/2023 Action  $Given Dose  150 mg Route  Intramuscular Site   Documented By  Keysha Medina MA    Ordering Provider: Lauren Mathew APRN CNP    NDC: 33367-641-99    : [facility]    Patient Supplied?: No                 Clinic Administered Medication Documentation        Patient was given Depo Provera. Prior to medication administration, verified patient's identity using patient s name and date of birth. Please see MAR and medication order for additional information. Patient instructed to remain in clinic for 15 minutes and report any adverse reaction to staff immediately.    Vial/Syringe: Single dose vial. Was entire vial of medication used? Yes    NEXT INJECTION DUE: 2/20/24 - 3/19/24

## 2023-12-05 NOTE — PROGRESS NOTES
SUBJECTIVE:   Daya is a 47 year old, presenting for the following:  Physical        2023     2:52 PM   Additional Questions   Roomed by Keysha KINCAID CMA   Accompanied by Self         2023     2:52 PM   Patient Reported Additional Medications   Patient reports taking the following new medications n/a       Healthy Habits:     Getting at least 3 servings of Calcium per day:  Yes    Bi-annual eye exam:  Yes    Dental care twice a year:  Yes    Sleep apnea or symptoms of sleep apnea:  None    Diet:  Regular (no restrictions)    Frequency of exercise:  None    Taking medications regularly:  Yes    Medication side effects:  Not applicable    Additional concerns today:  No      Today's PHQ-9 Score:       2023     2:52 PM   PHQ-9 SCORE   PHQ-9 Total Score MyChart 0   PHQ-9 Total Score 0                       Social History     Tobacco Use    Smoking status: Never    Smokeless tobacco: Never    Tobacco comments:     I don't smoke.   Substance Use Topics    Alcohol use: Not Currently     Comment: on holidays only              2023     4:31 PM   Alcohol Use   Prescreen: >3 drinks/day or >7 drinks/week? No          No data to display              Reviewed orders with patient.  Reviewed health maintenance and updated orders accordingly - Yes  Lab work is in process    Breast Cancer Screenin/28/2021    10:03 AM   Breast CA Risk Assessment (FHS-7)   Do you have a family history of breast, colon, or ovarian cancer? No / Unknown         Mammogram Screening: Recommended annual mammography  Pertinent mammograms are reviewed under the imaging tab.    History of abnormal Pap smear: NO - age 30-65 PAP every 5 years with negative HPV co-testing recommended      Latest Ref Rng & Units 10/16/2020    11:54 AM 10/16/2020    11:26 AM 2016     9:15 AM   PAP / HPV   PAP (Historical)   NIL     HPV 16 DNA NEG^Negative Negative   Negative    HPV 18 DNA NEG^Negative Negative   Negative    Other HR HPV NEG^Negative  "Negative   Negative      Reviewed and updated as needed this visit by clinical staff   Tobacco  Allergies  Meds              Reviewed and updated as needed this visit by Provider                     Review of Systems   Constitutional:  Negative for chills and fever.   HENT:  Negative for congestion, ear pain, hearing loss and sore throat.    Eyes:  Positive for visual disturbance. Negative for pain.   Respiratory:  Negative for cough and shortness of breath.    Cardiovascular:  Negative for chest pain, palpitations and peripheral edema.   Gastrointestinal:  Negative for abdominal pain, constipation, diarrhea, heartburn, hematochezia and nausea.   Breasts:  Negative for tenderness, breast mass and discharge.   Genitourinary:  Negative for dysuria, frequency, genital sores, hematuria, pelvic pain, urgency, vaginal bleeding and vaginal discharge.   Musculoskeletal:  Negative for arthralgias, joint swelling and myalgias.   Skin:  Negative for rash.   Neurological:  Negative for dizziness, weakness, headaches and paresthesias.   Psychiatric/Behavioral:  Negative for mood changes. The patient is not nervous/anxious.      Change in sight- normal for age     OBJECTIVE:   /80   Pulse 84   Temp 97.8  F (36.6  C) (Temporal)   Resp 16   Ht 1.562 m (5' 1.5\")   Wt 102.5 kg (226 lb)   SpO2 100%   BMI 42.01 kg/m    Physical Exam  GENERAL: healthy, alert and no distress  EYES: Eyes grossly normal to inspection, PERRL and conjunctivae and sclerae normal  HENT: ear canals and TM's normal, nose and mouth without ulcers or lesions  NECK: no adenopathy, no asymmetry, masses, or scars and thyroid normal to palpation  RESP: lungs clear to auscultation - no rales, rhonchi or wheezes  BREAST: normal without masses, tenderness or nipple discharge and no palpable axillary masses or adenopathy  CV: regular rate and rhythm, normal S1 S2, no S3 or S4, no murmur, click or rub, no peripheral edema and peripheral pulses " "strong  ABDOMEN: soft, nontender, no hepatosplenomegaly, no masses and bowel sounds normal  MS: no gross musculoskeletal defects noted, no edema  SKIN: no suspicious lesions or rashes  NEURO: Normal strength and tone, mentation intact and speech normal  PSYCH: mentation appears normal, affect normal/bright    Diagnostic Test Results:  Labs reviewed in Epic    ASSESSMENT/PLAN:       ICD-10-CM    1. Routine general medical examination at a health care facility  Z00.00 CBC with Platelets & Differential     Lipid panel reflex to direct LDL Fasting     Hemoglobin A1c     CBC with Platelets & Differential     Lipid panel reflex to direct LDL Fasting     Hemoglobin A1c      2. Depo-Provera contraceptive status  Z30.42 medroxyPROGESTERone (DEPO-PROVERA) injection 150 mg      3. Morbid obesity (H)  E66.01           Patient has been advised of split billing requirements and indicates understanding: Yes      COUNSELING:  Reviewed preventive health counseling, as reflected in patient instructions       Regular exercise       Healthy diet/nutrition       Contraception       Osteoporosis prevention/bone health       Obesity counseling- pt. Declined referrals      BMI:   Estimated body mass index is 42.01 kg/m  as calculated from the following:    Height as of this encounter: 1.562 m (5' 1.5\").    Weight as of this encounter: 102.5 kg (226 lb).   Weight management plan: Discussed healthy diet and exercise guidelines      She reports that she has never smoked. She has never used smokeless tobacco.        [facility]ID Ordonez [facility]  Answers submitted by the patient for this visit:  Patient Health Questionnaire (Submitted on 12/5/2023)  If you checked off any problems, how difficult have these problems made it for you to do your work, take care of things at home, or get along with other people?: Not difficult at all  PHQ9 TOTAL SCORE: 0    "

## 2023-12-06 LAB
CHOLEST SERPL-MCNC: 153 MG/DL
FASTING STATUS PATIENT QL REPORTED: NO
HDLC SERPL-MCNC: 58 MG/DL
LDLC SERPL CALC-MCNC: 89 MG/DL
NONHDLC SERPL-MCNC: 95 MG/DL
TRIGL SERPL-MCNC: 29 MG/DL

## 2024-01-04 ENCOUNTER — ANCILLARY PROCEDURE (OUTPATIENT)
Dept: MAMMOGRAPHY | Facility: CLINIC | Age: 49
End: 2024-01-04
Attending: NURSE PRACTITIONER
Payer: COMMERCIAL

## 2024-01-04 DIAGNOSIS — Z12.31 VISIT FOR SCREENING MAMMOGRAM: ICD-10-CM

## 2024-01-04 PROCEDURE — 77067 SCR MAMMO BI INCL CAD: CPT | Mod: GC

## 2024-01-04 PROCEDURE — 77063 BREAST TOMOSYNTHESIS BI: CPT | Mod: GC

## 2024-03-01 ENCOUNTER — ALLIED HEALTH/NURSE VISIT (OUTPATIENT)
Dept: FAMILY MEDICINE | Facility: CLINIC | Age: 49
End: 2024-03-01
Payer: COMMERCIAL

## 2024-03-01 DIAGNOSIS — Z30.42 DEPO-PROVERA CONTRACEPTIVE STATUS: Primary | ICD-10-CM

## 2024-03-01 PROCEDURE — 96372 THER/PROPH/DIAG INJ SC/IM: CPT | Performed by: NURSE PRACTITIONER

## 2024-03-01 PROCEDURE — 99207 PR NO CHARGE NURSE ONLY: CPT

## 2024-03-01 RX ADMIN — MEDROXYPROGESTERONE ACETATE 150 MG: 150 INJECTION, SUSPENSION INTRAMUSCULAR at 09:56

## 2024-03-01 NOTE — PROGRESS NOTES
Clinic Administered Medication Documentation      Depo Provera Documentation    Depo-Provera Standing Order inclusion/exclusion criteria reviewed.     Is this the initial or subsequent dose of Depo Provera? Subsequent dose - patient is within the acceptable window of time (11-15 weeks) for subsequent injection. Pregnancy test not indicated.    Patient meets: inclusion criteria     Is there an active order (written within the past 365 days, with administrations remaining, not ) in the chart? Yes.     Prior to injection, verified patient identity using patient's name and date of birth. Medication was administered. Please see MAR and medication order for additional information.     Vial/Syringe: Single dose vial. Was entire vial of medication used? Yes    Patient instructed to remain in clinic for 15 minutes and report any adverse reaction to staff immediately.  NEXT INJECTION DUE: 24 - 24    Verified that the patient has refills remaining in their prescription.

## 2024-05-19 ENCOUNTER — MYC MEDICAL ADVICE (OUTPATIENT)
Dept: FAMILY MEDICINE | Facility: CLINIC | Age: 49
End: 2024-05-19
Payer: COMMERCIAL

## 2024-05-20 NOTE — TELEPHONE ENCOUNTER
Replied to [place] [facility] Office[facility].    Kristan Ulloa CMA ([facility])

## 2024-06-07 ENCOUNTER — ALLIED HEALTH/NURSE VISIT (OUTPATIENT)
Dept: FAMILY MEDICINE | Facility: CLINIC | Age: 49
End: 2024-06-07
Payer: COMMERCIAL

## 2024-06-07 DIAGNOSIS — Z30.42 DEPO-PROVERA CONTRACEPTIVE STATUS: Primary | ICD-10-CM

## 2024-06-07 PROCEDURE — 99207 PR NO CHARGE NURSE ONLY: CPT

## 2024-06-07 PROCEDURE — 96372 THER/PROPH/DIAG INJ SC/IM: CPT | Performed by: NURSE PRACTITIONER

## 2024-06-07 RX ADMIN — MEDROXYPROGESTERONE ACETATE 150 MG: 150 INJECTION, SUSPENSION INTRAMUSCULAR at 10:04

## 2024-09-13 ENCOUNTER — ALLIED HEALTH/NURSE VISIT (OUTPATIENT)
Dept: FAMILY MEDICINE | Facility: CLINIC | Age: 49
End: 2024-09-13
Payer: COMMERCIAL

## 2024-09-13 DIAGNOSIS — Z30.42 DEPO-PROVERA CONTRACEPTIVE STATUS: Primary | ICD-10-CM

## 2024-09-13 PROCEDURE — 96372 THER/PROPH/DIAG INJ SC/IM: CPT | Performed by: NURSE PRACTITIONER

## 2024-09-13 PROCEDURE — 99207 PR NO CHARGE NURSE ONLY: CPT

## 2024-09-13 RX ADMIN — MEDROXYPROGESTERONE ACETATE 150 MG: 150 INJECTION, SUSPENSION INTRAMUSCULAR at 10:04

## 2024-09-13 NOTE — NURSING NOTE
Clinic Administered Medication Documentation      Depo Provera Documentation    Depo-Provera Standing Order inclusion/exclusion criteria reviewed.     Is this the initial or subsequent dose of Depo Provera? Subsequent dose - patient is within the acceptable window of time (11-15 weeks) for subsequent injection. Pregnancy test not indicated.    Patient meets: inclusion criteria     Is there an active order (written within the past 365 days, with administrations remaining, not ) in the chart? Yes.     Prior to injection, verified patient identity using patient's name and date of birth. Medication was administered. Please see MAR and medication order for additional information.     Vial/Syringe: Single dose vial. Was entire vial of medication used? Yes    Patient instructed to remain in clinic for 15 minutes and report any adverse reaction to staff immediately.  NEXT INJECTION DUE: 24 - 24    Patient has no refills remaining.  Patient has scheduled annual visit

## 2024-12-09 SDOH — HEALTH STABILITY: PHYSICAL HEALTH: ON AVERAGE, HOW MANY DAYS PER WEEK DO YOU ENGAGE IN MODERATE TO STRENUOUS EXERCISE (LIKE A BRISK WALK)?: 2 DAYS

## 2024-12-09 SDOH — HEALTH STABILITY: PHYSICAL HEALTH: ON AVERAGE, HOW MANY MINUTES DO YOU ENGAGE IN EXERCISE AT THIS LEVEL?: 60 MIN

## 2024-12-09 ASSESSMENT — SOCIAL DETERMINANTS OF HEALTH (SDOH): HOW OFTEN DO YOU GET TOGETHER WITH FRIENDS OR RELATIVES?: ONCE A WEEK

## 2024-12-09 ASSESSMENT — PATIENT HEALTH QUESTIONNAIRE - PHQ9
SUM OF ALL RESPONSES TO PHQ QUESTIONS 1-9: 3
10. IF YOU CHECKED OFF ANY PROBLEMS, HOW DIFFICULT HAVE THESE PROBLEMS MADE IT FOR YOU TO DO YOUR WORK, TAKE CARE OF THINGS AT HOME, OR GET ALONG WITH OTHER PEOPLE: NOT DIFFICULT AT ALL
SUM OF ALL RESPONSES TO PHQ QUESTIONS 1-9: 3

## 2024-12-10 ENCOUNTER — OFFICE VISIT (OUTPATIENT)
Dept: FAMILY MEDICINE | Facility: CLINIC | Age: 49
End: 2024-12-10
Attending: NURSE PRACTITIONER
Payer: COMMERCIAL

## 2024-12-10 VITALS
BODY MASS INDEX: 39.51 KG/M2 | OXYGEN SATURATION: 100 % | DIASTOLIC BLOOD PRESSURE: 88 MMHG | HEART RATE: 80 BPM | SYSTOLIC BLOOD PRESSURE: 130 MMHG | RESPIRATION RATE: 14 BRPM | HEIGHT: 62 IN | WEIGHT: 214.7 LBS | TEMPERATURE: 98 F

## 2024-12-10 DIAGNOSIS — E66.812 CLASS 2 SEVERE OBESITY DUE TO EXCESS CALORIES WITH SERIOUS COMORBIDITY IN ADULT, UNSPECIFIED BMI (H): ICD-10-CM

## 2024-12-10 DIAGNOSIS — Z12.31 ENCOUNTER FOR SCREENING MAMMOGRAM FOR BREAST CANCER: ICD-10-CM

## 2024-12-10 DIAGNOSIS — R03.0 ELEVATED BLOOD PRESSURE READING WITHOUT DIAGNOSIS OF HYPERTENSION: ICD-10-CM

## 2024-12-10 DIAGNOSIS — F33.42 RECURRENT MAJOR DEPRESSION IN COMPLETE REMISSION (H): ICD-10-CM

## 2024-12-10 DIAGNOSIS — E66.01 CLASS 2 SEVERE OBESITY DUE TO EXCESS CALORIES WITH SERIOUS COMORBIDITY IN ADULT, UNSPECIFIED BMI (H): ICD-10-CM

## 2024-12-10 DIAGNOSIS — Z00.00 ROUTINE MEDICAL EXAM: Primary | ICD-10-CM

## 2024-12-10 DIAGNOSIS — Z30.42 ENCOUNTER FOR DEPO-PROVERA CONTRACEPTION: ICD-10-CM

## 2024-12-10 LAB
BASOPHILS # BLD AUTO: 0.1 10E3/UL (ref 0–0.2)
BASOPHILS NFR BLD AUTO: 1 %
CHOLEST SERPL-MCNC: 153 MG/DL
EOSINOPHIL # BLD AUTO: 0.2 10E3/UL (ref 0–0.7)
EOSINOPHIL NFR BLD AUTO: 2 %
ERYTHROCYTE [DISTWIDTH] IN BLOOD BY AUTOMATED COUNT: 12.7 % (ref 10–15)
EST. AVERAGE GLUCOSE BLD GHB EST-MCNC: 103 MG/DL
FASTING STATUS PATIENT QL REPORTED: NO
FASTING STATUS PATIENT QL REPORTED: NO
GLUCOSE SERPL-MCNC: 87 MG/DL (ref 70–99)
HBA1C MFR BLD: 5.2 % (ref 0–5.6)
HCT VFR BLD AUTO: 45.4 % (ref 35–47)
HDLC SERPL-MCNC: 56 MG/DL
HGB BLD-MCNC: 15.1 G/DL (ref 11.7–15.7)
IMM GRANULOCYTES # BLD: 0 10E3/UL
IMM GRANULOCYTES NFR BLD: 0 %
LDLC SERPL CALC-MCNC: 89 MG/DL
LYMPHOCYTES # BLD AUTO: 3.2 10E3/UL (ref 0.8–5.3)
LYMPHOCYTES NFR BLD AUTO: 38 %
MCH RBC QN AUTO: 28.7 PG (ref 26.5–33)
MCHC RBC AUTO-ENTMCNC: 33.3 G/DL (ref 31.5–36.5)
MCV RBC AUTO: 86 FL (ref 78–100)
MONOCYTES # BLD AUTO: 0.7 10E3/UL (ref 0–1.3)
MONOCYTES NFR BLD AUTO: 9 %
NEUTROPHILS # BLD AUTO: 4.3 10E3/UL (ref 1.6–8.3)
NEUTROPHILS NFR BLD AUTO: 51 %
NONHDLC SERPL-MCNC: 97 MG/DL
PLATELET # BLD AUTO: 251 10E3/UL (ref 150–450)
RBC # BLD AUTO: 5.27 10E6/UL (ref 3.8–5.2)
TRIGL SERPL-MCNC: 38 MG/DL
TSH SERPL DL<=0.005 MIU/L-ACNC: 1.15 UIU/ML (ref 0.3–4.2)
WBC # BLD AUTO: 8.5 10E3/UL (ref 4–11)

## 2024-12-10 PROCEDURE — 90746 HEPB VACCINE 3 DOSE ADULT IM: CPT | Performed by: NURSE PRACTITIONER

## 2024-12-10 PROCEDURE — 84443 ASSAY THYROID STIM HORMONE: CPT | Performed by: NURSE PRACTITIONER

## 2024-12-10 PROCEDURE — 90471 IMMUNIZATION ADMIN: CPT | Performed by: NURSE PRACTITIONER

## 2024-12-10 PROCEDURE — 96372 THER/PROPH/DIAG INJ SC/IM: CPT | Performed by: NURSE PRACTITIONER

## 2024-12-10 PROCEDURE — 85025 COMPLETE CBC W/AUTO DIFF WBC: CPT | Performed by: NURSE PRACTITIONER

## 2024-12-10 PROCEDURE — 36415 COLL VENOUS BLD VENIPUNCTURE: CPT | Performed by: NURSE PRACTITIONER

## 2024-12-10 PROCEDURE — 83036 HEMOGLOBIN GLYCOSYLATED A1C: CPT | Performed by: NURSE PRACTITIONER

## 2024-12-10 PROCEDURE — 80061 LIPID PANEL: CPT | Performed by: NURSE PRACTITIONER

## 2024-12-10 PROCEDURE — 99396 PREV VISIT EST AGE 40-64: CPT | Mod: 25 | Performed by: NURSE PRACTITIONER

## 2024-12-10 PROCEDURE — 82947 ASSAY GLUCOSE BLOOD QUANT: CPT | Performed by: NURSE PRACTITIONER

## 2024-12-10 RX ORDER — MEDROXYPROGESTERONE ACETATE 150 MG/ML
150 INJECTION, SUSPENSION INTRAMUSCULAR
Status: ACTIVE | OUTPATIENT
Start: 2024-12-10 | End: 2025-12-05

## 2024-12-10 RX ADMIN — MEDROXYPROGESTERONE ACETATE 150 MG: 150 INJECTION, SUSPENSION INTRAMUSCULAR at 14:32

## 2024-12-10 ASSESSMENT — PAIN SCALES - GENERAL: PAINLEVEL_OUTOF10: NO PAIN (0)

## 2024-12-10 NOTE — RESULT ENCOUNTER NOTE
Daya,  Your labs that have returned are normal. More labs are still processing.     Sincerely,  Lauren Mathew DNP

## 2024-12-10 NOTE — PROGRESS NOTES
Preventive Care Visit  [facility] JAVID So CNP, Family Medicine  Dec 10, 2024      Assessment & Plan     Routine medical exam  Immunization recommendations reviewed and ordered per discussion and patient agreement.    Discussed medication coverage for vaccines, and if appropriate to have done at pharmacy.    Discussed options and rationale.  Ordered HCM testing per our discussion and patient decision based on USPSTF and Cancer screening guidelines.      Reinforced healthy habits with lifestyle, exercise and nutrition.  - Glucose; Future  - CBC with Platelets & Differential; Future  - Lipid panel reflex to direct LDL Fasting; Future  - Hemoglobin A1c; Future  - TSH with free T4 reflex; Future  - Glucose  - CBC with Platelets & Differential  - Lipid panel reflex to direct LDL Fasting  - Hemoglobin A1c  - TSH with free T4 reflex    Recurrent major depression in complete remission (H)  Self-monitoring.     Class 2 severe obesity due to excess calories with serious comorbidity in adult, unspecified BMI (H)  Discussed weight loss to be gradual in nature, but consistent and not drastic. Really stressed importance of exercise to maintain muscle mass and overall health. In addition, advising healthy intake and nutrition balance.     Encounter for screening mammogram for breast cancer    - MA Screen Bilateral w/Chaitanya; Future    Encounter for Depo-Provera contraception  Updating for 1 year, continue calcium supplementation with Vitamin D  Answered questions.     - medroxyPROGESTERone (DEPO-PROVERA) injection 150 mg    Elevated blood pressure reading without diagnosis of hypertension  Monitoring in community, will report if high.   Healthy habits, and exercise encouraged.       Patient has been advised of split billing requirements and indicates understanding: Yes        BMI  Estimated body mass index is 39.36 kg/m  as calculated from the following:    Height as of this encounter: 1.573 m (5'  "1.93\").    Weight as of this encounter: 97.4 kg (214 lb 11.2 oz).   Weight management plan: Discussed healthy diet and exercise guidelines    Counseling  Appropriate preventive services were addressed with this patient via screening, questionnaire, or discussion as appropriate for fall prevention, nutrition, physical activity, Tobacco-use cessation, social engagement, weight loss and cognition.  Checklist reviewing preventive services available has been given to the patient.  Reviewed patient's diet, addressing concerns and/or questions.   She is at risk for lack of exercise and has been provided with information to increase physical activity for the benefit of her well-being.   She is at risk for psychosocial distress and has been provided with information to reduce risk.       MEDICATIONS:  Continue current medications without change    Subjective   Daya is a 49 year old, presenting for the following:  Physical        12/10/2024     1:34 PM   Additional Questions   Roomed by Monet OROZCO   Accompanied by None         12/10/2024     1:34 PM   Patient Reported Additional Medications   Patient reports taking the following new medications NA        HPI  Due for Depo follow-up.     Mammogram-coming due  Colonoscopy is UTD.             Health Care Directive  Patient does not have a Health Care Directive: Discussed advance care planning with patient; however, patient declined at this time.        12/9/2024   General Health   How would you rate your overall physical health? Good   Feel stress (tense, anxious, or unable to sleep) To some extent      (!) STRESS CONCERN      12/9/2024   Nutrition   Three or more servings of calcium each day? (!) NO   Diet: Regular (no restrictions)   How many servings of fruit and vegetables per day? (!) 0-1   How many sweetened beverages each day? 0-1            12/9/2024   Exercise   Days per week of moderate/strenous exercise 2 days   Average minutes spent exercising at this level 60 min    "   (!) EXERCISE CONCERN      12/9/2024   Social Factors   Frequency of gathering with friends or relatives Once a week   Worry food won't last until get money to buy more No   Food not last or not have enough money for food? No   Do you have housing? (Housing is defined as stable permanent housing and does not include staying ouside in a car, in a tent, in an abandoned building, in an overnight shelter, or couch-surfing.) Yes   Are you worried about losing your housing? No   Lack of transportation? No   Unable to get utilities (heat,electricity)? No            12/9/2024   Dental   Dentist two times every year? Yes            12/9/2024   TB Screening   Were you born outside of the [place]? No          Today's PHQ-9 Score:       12/9/2024    11:42 AM   PHQ-9 SCORE   PHQ-9 Total Score MyChart 3 (Minimal depression)   PHQ-9 Total Score 3        Patient-reported         12/9/2024   Substance Use   Alcohol more than 3/day or more than 7/wk No   Do you use any other substances recreationally? No        Social History     Tobacco Use    Smoking status: Never    Smokeless tobacco: Never    Tobacco comments:     I don't smoke.   Vaping Use    Vaping status: Never Used   Substance Use Topics    Alcohol use: Not Currently     Comment: on holidays only     Drug use: No             12/9/2024   Breast Cancer Screening   Family history of breast, colon, or ovarian cancer? No / Unknown          1/4/2024   LAST FHS-7 RESULTS   1st degree relative breast or ovarian cancer No   Any relative bilateral breast cancer No   Any male have breast cancer No   Any ONE woman have BOTH breast AND ovarian cancer No   Any woman with breast cancer before 50yrs No   2 or more relatives with breast AND/OR ovarian cancer No   2 or more relatives with breast AND/OR bowel cancer No           Mammogram Screening - Mammogram every 1-2 years updated in Health Maintenance based on mutual decision making        12/9/2024   STI Screening   New sexual partner(s)  "since last STI/HIV test? No        History of abnormal Pap smear: No - age 30- 64 PAP with HPV every 5 years recommended        Latest Ref Rng & Units 10/16/2020    11:54 AM 10/16/2020    11:26 AM 8/24/2016     9:15 AM   PAP / HPV   PAP (Historical)   NIL     HPV 16 DNA NEG^Negative Negative   Negative    HPV 18 DNA NEG^Negative Negative   Negative    Other HR HPV NEG^Negative Negative   Negative      ASCVD Risk   The 10-year ASCVD risk score (Mamta BOLTON, et al., 2019) is: 0.6%    Values used to calculate the score:      Age: 49 years      Sex: Female      Is Non- : No      Diabetic: No      Tobacco smoker: No      Systolic Blood Pressure: 130 mmHg      Is BP treated: No      HDL Cholesterol: 58 mg/dL      Total Cholesterol: 153 mg/dL        12/9/2024   Contraception/Family Planning   Questions about contraception or family planning No           Reviewed and updated as needed this visit by Provider   Tobacco                  Lab work is in process      Review of Systems  Constitutional, neuro, ENT, endocrine, pulmonary, cardiac, gastrointestinal, genitourinary, musculoskeletal, integument and psychiatric systems are negative, except as otherwise noted.     Objective    Exam  /88 (BP Location: Left arm, Patient Position: Left side)   Pulse 80   Temp 98  F (36.7  C) (Temporal)   Resp 14   Ht 1.573 m (5' 1.93\")   Wt 97.4 kg (214 lb 11.2 oz)   SpO2 100%   BMI 39.36 kg/m     Estimated body mass index is 39.36 kg/m  as calculated from the following:    Height as of this encounter: 1.573 m (5' 1.93\").    Weight as of this encounter: 97.4 kg (214 lb 11.2 oz).    Physical Exam  GENERAL: alert and no distress  EYES: Eyes grossly normal to inspection, PERRL and conjunctivae and sclerae normal  HENT: ear canals and TM's normal, nose and mouth without ulcers or lesions  NECK: no adenopathy, no asymmetry, masses, or scars  RESP: lungs clear to auscultation - no rales, rhonchi or " wheezes  CV: regular rate and rhythm, normal S1 S2, no S3 or S4, no murmur, click or rub, no peripheral edema  ABDOMEN: soft, nontender, no hepatosplenomegaly, no masses and bowel sounds normal  MS: no gross musculoskeletal defects noted, no edema  SKIN: no suspicious lesions or rashes  NEURO: Normal strength and tone, mentation intact and speech normal  PSYCH: mentation appears normal, affect normal/bright        Signed Electronically by: JAVID Ordonez CNP

## 2024-12-10 NOTE — PATIENT INSTRUCTIONS
Patient Education   Preventive Care Advice   This is general advice given by our system to help you stay healthy. However, your care team may have specific advice just for you. Please talk to your care team about your preventive care needs.  Nutrition  Eat 5 or more servings of fruits and vegetables each day.  Try wheat bread, brown rice and whole grain pasta (instead of white bread, rice, and pasta).  Get enough calcium and vitamin D. Check the label on foods and aim for 100% of the RDA (recommended daily allowance).  Lifestyle  Exercise at least 150 minutes each week  (30 minutes a day, 5 days a week).  Do muscle strengthening activities 2 days a week. These help control your weight and prevent disease.  No smoking.  Wear sunscreen to prevent skin cancer.  Have a dental exam and cleaning every 6 months.  Yearly exams  See your health care team every year to talk about:  Any changes in your health.  Any medicines your care team has prescribed.  Preventive care, family planning, and ways to prevent chronic diseases.  Shots (vaccines)   HPV shots (up to age 26), if you've never had them before.  Hepatitis B shots (up to age 59), if you've never had them before.  COVID-19 shot: Get this shot when it's due.  Flu shot: Get a flu shot every year.  Tetanus shot: Get a tetanus shot every 10 years.  Pneumococcal, hepatitis A, and RSV shots: Ask your care team if you need these based on your risk.  Shingles shot (for age 50 and up)  General health tests  Diabetes screening:  Starting at age 35, Get screened for diabetes at least every 3 years.  If you are younger than age 35, ask your care team if you should be screened for diabetes.  Cholesterol test: At age 39, start having a cholesterol test every 5 years, or more often if advised.  Bone density scan (DEXA): At age 50, ask your care team if you should have this scan for osteoporosis (brittle bones).  Hepatitis C: Get tested at least once in your life.  STIs (sexually  transmitted infections)  Before age 24: Ask your care team if you should be screened for STIs.  After age 24: Get screened for STIs if you're at risk. You are at risk for STIs (including HIV) if:  You are sexually active with more than one person.  You don't use condoms every time.  You or a partner was diagnosed with a sexually transmitted infection.  If you are at risk for HIV, ask about PrEP medicine to prevent HIV.  Get tested for HIV at least once in your life, whether you are at risk for HIV or not.  Cancer screening tests  Cervical cancer screening: If you have a cervix, begin getting regular cervical cancer screening tests starting at age 21.  Breast cancer scan (mammogram): If you've ever had breasts, begin having regular mammograms starting at age 40. This is a scan to check for breast cancer.  Colon cancer screening: It is important to start screening for colon cancer at age 45.  Have a colonoscopy test every 10 years (or more often if you're at risk) Or, ask your provider about stool tests like a FIT test every year or Cologuard test every 3 years.  To learn more about your testing options, visit:   .  For help making a decision, visit:   https://bit.ly/eq54606.  Prostate cancer screening test: If you have a prostate, ask your care team if a prostate cancer screening test (PSA) at age 55 is right for you.  Lung cancer screening: If you are a current or former smoker ages 50 to 80, ask your care team if ongoing lung cancer screenings are right for you.  For informational purposes only. Not to replace the advice of your health care provider. Copyright   2023 [facility]. All rights reserved. Clinically reviewed by the [facility]. [employer] 233404 - REV 01/24.  Learning About Stress  What is stress?     Stress is your body's response to a hard situation. Your body can have a physical, emotional, or mental response. Stress is a fact of life for most people, and it  affects everyone differently. What causes stress for you may not be stressful for someone else.  A lot of things can cause stress. You may feel stress when you go on a job interview, take a test, or run a race. This kind of short-term stress is normal and even useful. It can help you if you need to work hard or react quickly. For example, stress can help you finish an important job on time.  Long-term stress is caused by ongoing stressful situations or events. Examples of long-term stress include long-term health problems, ongoing problems at work, or conflicts in your family. Long-term stress can harm your health.  How does stress affect your health?  When you are stressed, your body responds as though you are in danger. It makes hormones that speed up your heart, make you breathe faster, and give you a burst of energy. This is called the fight-or-flight stress response. If the stress is over quickly, your body goes back to normal and no harm is done.  But if stress happens too often or lasts too long, it can have bad effects. Long-term stress can make you more likely to get sick, and it can make symptoms of some diseases worse. If you tense up when you are stressed, you may develop neck, shoulder, or low back pain. Stress is linked to high blood pressure and heart disease.  Stress also harms your emotional health. It can make you [place], tense, or depressed. Your relationships may suffer, and you may not do well at work or school.  What can you do to manage stress?  You can try these things to help manage stress:   Do something active. Exercise or activity can help reduce stress. Walking is a great way to get started. Even everyday activities such as housecleaning or yard work can help.  Try yoga or arnoldo chi. These techniques combine exercise and meditation. You may need some training at first to learn them.  Do something you enjoy. For example, listen to music or go to a movie. Practice your hobby or do volunteer  "work.  Meditate. This can help you relax, because you are not worrying about what happened before or what may happen in the future.  Do guided imagery. Imagine yourself in any setting that helps you feel calm. You can use online videos, books, or a teacher to guide you.  Do breathing exercises. For example:  From a standing position, bend forward from the waist with your knees slightly bent. Let your arms dangle close to the floor.  Breathe in slowly and deeply as you return to a standing position. Roll up slowly and lift your head last.  Hold your breath for just a few seconds in the standing position.  Breathe out slowly and bend forward from the waist.  Let your feelings out. Talk, laugh, cry, and express anger when you need to. Talking with supportive friends or family, a counselor, or a donna leader about your feelings is a healthy way to relieve stress. Avoid discussing your feelings with people who make you feel worse.  Write. It may help to write about things that are bothering you. This helps you find out how much stress you feel and what is causing it. When you know this, you can find better ways to cope.  What can you do to prevent stress?  You might try some of these things to help prevent stress:  Manage your time. This helps you find time to do the things you want and need to do.  Get enough sleep. Your body recovers from the stresses of the day while you are sleeping.  Get support. Your family, friends, and community can make a difference in how you experience stress.  Limit your news feed. Avoid or limit time on social media or news that may make you feel stressed.  Do something active. Exercise or activity can help reduce stress. Walking is a great way to get started.  Where can you learn more?  Go to https://www.[employer].net/patiented  Enter N032 in the search box to learn more about \"Learning About Stress.\"  Current as of: October 24, 2023  Content Version: 14.2 2024 [employer]. "   Care instructions adapted under license by your healthcare professional. If you have questions about a medical condition or this instruction, always ask your healthcare professional. [employer] disclaims any warranty or liability for your use of this information.

## 2024-12-10 NOTE — NURSING NOTE

## 2024-12-11 ENCOUNTER — PATIENT OUTREACH (OUTPATIENT)
Dept: CARE COORDINATION | Facility: CLINIC | Age: 49
End: 2024-12-11
Payer: COMMERCIAL

## 2024-12-11 NOTE — RESULT ENCOUNTER NOTE
Maximilian Stapleton,  I have reviewed your labs, and they are all in expected range.     If you have questions, please notify me through MyChart or a telephone call .    Sincerely,  Lauren Mathew DNP

## 2025-02-19 ENCOUNTER — ANCILLARY PROCEDURE (OUTPATIENT)
Dept: MAMMOGRAPHY | Facility: CLINIC | Age: 50
End: 2025-02-19
Attending: NURSE PRACTITIONER
Payer: COMMERCIAL

## 2025-02-19 DIAGNOSIS — Z12.31 ENCOUNTER FOR SCREENING MAMMOGRAM FOR BREAST CANCER: ICD-10-CM

## 2025-02-19 PROCEDURE — 77067 SCR MAMMO BI INCL CAD: CPT | Performed by: RADIOLOGY

## 2025-02-19 PROCEDURE — 77063 BREAST TOMOSYNTHESIS BI: CPT | Performed by: RADIOLOGY

## 2025-03-18 ENCOUNTER — ALLIED HEALTH/NURSE VISIT (OUTPATIENT)
Dept: FAMILY MEDICINE | Facility: CLINIC | Age: 50
End: 2025-03-18
Payer: COMMERCIAL

## 2025-03-18 DIAGNOSIS — Z30.42 ENCOUNTER FOR DEPO-PROVERA CONTRACEPTION: Primary | ICD-10-CM

## 2025-03-18 PROCEDURE — 99207 PR NO CHARGE NURSE ONLY: CPT

## 2025-03-18 PROCEDURE — 96372 THER/PROPH/DIAG INJ SC/IM: CPT | Performed by: NURSE PRACTITIONER

## 2025-03-18 RX ADMIN — MEDROXYPROGESTERONE ACETATE 150 MG: 150 INJECTION, SUSPENSION INTRAMUSCULAR at 09:57

## 2025-03-18 NOTE — PROGRESS NOTES
Clinic Administered Medication Documentation      Depo Provera Documentation    Depo-Provera Standing Order inclusion/exclusion criteria reviewed.     Is this the initial or subsequent dose of Depo Provera? Subsequent dose - patient is within the acceptable window of time (11-15 weeks) for subsequent injection. Pregnancy test not indicated.    Patient meets: inclusion criteria     Is there an active order (written within the past 365 days, with administrations remaining, not ) in the chart? Yes.     Prior to injection, verified patient identity using patient's name and date of birth. Medication was administered. Please see MAR and medication order for additional information.     Vial/Syringe: Single dose vial. Was entire vial of medication used? Yes    Patient instructed to remain in clinic for 15 minutes and report any adverse reaction to staff immediately.  NEXT INJECTION DUE: 6/3/25 - 25    Verified that the patient has refills remaining in their prescription.

## 2025-06-21 ASSESSMENT — PATIENT HEALTH QUESTIONNAIRE - PHQ9
10. IF YOU CHECKED OFF ANY PROBLEMS, HOW DIFFICULT HAVE THESE PROBLEMS MADE IT FOR YOU TO DO YOUR WORK, TAKE CARE OF THINGS AT HOME, OR GET ALONG WITH OTHER PEOPLE: NOT DIFFICULT AT ALL
SUM OF ALL RESPONSES TO PHQ QUESTIONS 1-9: 1
SUM OF ALL RESPONSES TO PHQ QUESTIONS 1-9: 1

## 2025-06-24 ENCOUNTER — ALLIED HEALTH/NURSE VISIT (OUTPATIENT)
Dept: FAMILY MEDICINE | Facility: CLINIC | Age: 50
End: 2025-06-24
Payer: COMMERCIAL

## 2025-06-24 DIAGNOSIS — Z30.42 ENCOUNTER FOR DEPO-PROVERA CONTRACEPTION: Primary | ICD-10-CM

## 2025-06-24 PROCEDURE — 99207 PR NO CHARGE NURSE ONLY: CPT

## 2025-06-24 PROCEDURE — 96372 THER/PROPH/DIAG INJ SC/IM: CPT | Performed by: NURSE PRACTITIONER

## 2025-06-24 RX ADMIN — MEDROXYPROGESTERONE ACETATE 150 MG: 150 INJECTION, SUSPENSION INTRAMUSCULAR at 09:47

## 2025-06-24 NOTE — PROGRESS NOTES
Chief Complaint   Patient presents with    Allied Health Visit     Depo inj     Clinic Administered Medication Documentation      Depo Provera Documentation    Depo-Provera Standing Order inclusion/exclusion criteria reviewed.     Is this the initial or subsequent dose of Depo Provera? Subsequent dose - patient is within the acceptable window of time (11-15 weeks) for subsequent injection. Pregnancy test not indicated.    Patient meets: inclusion criteria     Is there an active order (written within the past 365 days, with administrations remaining, not ) in the chart? Yes.     Prior to injection, verified patient identity using patient's name and date of birth. Medication was administered. Please see MAR and medication order for additional information.     Vial/Syringe: Single dose vial. Was entire vial of medication used? Yes    Patient instructed to remain in clinic for 15 minutes and report any adverse reaction to staff immediately.  NEXT INJECTION DUE: 25 - 10/7/25    Verified that the patient has refills remaining in their prescription.      Kristan Ulloa CMA ([facility])

## 2025-07-24 ENCOUNTER — MYC MEDICAL ADVICE (OUTPATIENT)
Dept: FAMILY MEDICINE | Facility: CLINIC | Age: 50
End: 2025-07-24
Payer: COMMERCIAL

## 2025-07-24 NOTE — TELEPHONE ENCOUNTER
Depo Shot    Wondering if should stay on it.    Leif Ornelas RN  [facility]  July 24, 2025

## 2025-07-24 NOTE — TELEPHONE ENCOUNTER
Patient has been call and schedule an appointment.     Leif Ornelas RN  [facility]  July 24, 2025

## 2025-07-29 ENCOUNTER — VIRTUAL VISIT (OUTPATIENT)
Dept: FAMILY MEDICINE | Facility: CLINIC | Age: 50
End: 2025-07-29
Payer: COMMERCIAL

## 2025-07-29 DIAGNOSIS — Z30.42 ENCOUNTER FOR SURVEILLANCE OF INJECTABLE CONTRACEPTIVE: Primary | ICD-10-CM

## 2025-07-29 PROCEDURE — 98013 SYNCH AUDIO-ONLY EST LOW 20: CPT | Performed by: NURSE PRACTITIONER

## 2025-07-29 NOTE — PROGRESS NOTES
"Daya is a 49 year old who is being evaluated via a billable telephone visit.    What phone number would you like to be contacted at?   How would you like to obtain your AVS? Gaetano  Originating Location (pt. Location): Home    Distant Location (provider location):  Off-site  Telephone visit completed due to the patient did not consent to a video visit.    Assessment & Plan     ASSESSMENT/ORDERS:    ICD-10-CM    1. Encounter for surveillance of injectable contraceptive  Z30.42           Assessment & Plan  Anxiety related to long-term use of Depo-Provera:  - Anxiety due to concerns about prolonged use of Depo-Provera and potential health risks.  - Continue Depo-Provera for another year and reassess at the next physical. Consider switching to reversible birth control or using condoms if anxiety persists.- canot have estrogen due to prev. PE.     Concerns about potential risk of meningioma:  - No direct correlation between Depo-Provera and meningioma. Risk is very low based on available studies.  - If cognitive problems or extreme changes occur, imaging can be considered.    Vision changes:  - Vision changes likely due to age-related factors.    AI and/or dictation software was used for the creation of this note.    The longitudinal plan of care for the diagnosis(es)/condition(s) as documented were addressed during this visit. Due to the added complexity in care, I will continue to support Daya in the subsequent management and with ongoing continuity of care.       BMI  Estimated body mass index is 39.36 kg/m  as calculated from the following:    Height as of 12/10/24: 1.573 m (5' 1.93\").    Weight as of 12/10/24: 97.4 kg (214 lb 11.2 oz).   Weight management plan: healthy habits as able.         Subjective   Daya is a 49 year old, presenting for the following health issues:  No chief complaint on file.        12/10/2024     1:34 PM   Additional Questions   Roomed by Monet OROZCO   Accompanied by None     History of Present " Illness       Reason for visit:  Birth control follow-up   She is taking medications regularly.        History of Present Illness-  Anxiety related to Depo-Provera use  - Ongoing anxiety about long-term Depo-Provera use, especially after reading media reports and seeing commercials about possible risks, including brain tumors  - Anxiety has increased since December 2024, after discussing concerns at physical exam  - Concern about potential irreversible effects from prolonged Depo-Provera use  - No current cognitive problems or extreme changes reported    Contraceptive management  - On Depo-Provera for over 7 years  - Considering discontinuation of Depo-Provera due to anxiety about risks  - History of blood clot after using an estrogen-containing pill over 10 years ago during a short course of Accutane  - Currently in a relationship; reports intercourse at most once a week    Vision changes  - Noted worsening of near vision over the past 3 years  - No recent eye exam in the last 3 years    Menopausal status concerns  - Questioned whether at menopausal age and if continued contraception is necessary        Review of Systems  Constitutional, HEENT, cardiovascular, pulmonary, gi and gu systems are negative, except as otherwise noted.      Objective           Vitals:  No vitals were obtained today due to virtual visit.    Physical Exam   General: Alert and no distress //Respiratory: No audible wheeze, cough, or shortness of breath // Psychiatric:  Appropriate affect, tone, and pace of words            Phone call duration: 10 minutes  Signed Electronically by: JAVID Ordonez CNP

## (undated) DEVICE — PREP CHLORAPREP 26ML TINTED ORANGE  260815

## (undated) DEVICE — PAD CHUX UNDERPAD 23X24" 7136

## (undated) DEVICE — KIT ENDO FIRST STEP DISINFECTANT 200ML W/POUCH EP-4

## (undated) RX ORDER — DIPHENHYDRAMINE HYDROCHLORIDE 50 MG/ML
INJECTION INTRAMUSCULAR; INTRAVENOUS
Status: DISPENSED
Start: 2023-04-18

## (undated) RX ORDER — FENTANYL CITRATE 50 UG/ML
INJECTION, SOLUTION INTRAMUSCULAR; INTRAVENOUS
Status: DISPENSED
Start: 2023-04-18